# Patient Record
Sex: MALE | Race: WHITE | NOT HISPANIC OR LATINO | Employment: STUDENT | URBAN - METROPOLITAN AREA
[De-identification: names, ages, dates, MRNs, and addresses within clinical notes are randomized per-mention and may not be internally consistent; named-entity substitution may affect disease eponyms.]

---
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
## 2020-09-06 PROCEDURE — U0003 INFECTIOUS AGENT DETECTION BY NUCLEIC ACID (DNA OR RNA); SEVERE ACUTE RESPIRATORY SYNDROME CORONAVIRUS 2 (SARS-COV-2) (CORONAVIRUS DISEASE [COVID-19]), AMPLIFIED PROBE TECHNIQUE, MAKING USE OF HIGH THROUGHPUT TECHNOLOGIES AS DESCRIBED BY CMS-2020-01-R: HCPCS | Performed by: HOSPITALIST

## 2020-09-07 ENCOUNTER — LAB REQUISITION (OUTPATIENT)
Dept: LAB | Facility: HOSPITAL | Age: 20
End: 2020-09-07
Attending: HOSPITALIST
Payer: COMMERCIAL

## 2020-09-07 DIAGNOSIS — Z11.59 ENCOUNTER FOR SCREENING FOR OTHER VIRAL DISEASES: ICD-10-CM

## 2020-09-10 LAB — SARS-COV-2 RNA RESP QL NAA+PROBE: NOT DETECTED

## 2020-09-24 PROCEDURE — U0003 INFECTIOUS AGENT DETECTION BY NUCLEIC ACID (DNA OR RNA); SEVERE ACUTE RESPIRATORY SYNDROME CORONAVIRUS 2 (SARS-COV-2) (CORONAVIRUS DISEASE [COVID-19]), AMPLIFIED PROBE TECHNIQUE, MAKING USE OF HIGH THROUGHPUT TECHNOLOGIES AS DESCRIBED BY CMS-2020-01-R: HCPCS | Performed by: HOSPITALIST

## 2020-09-25 ENCOUNTER — LAB REQUISITION (OUTPATIENT)
Dept: LAB | Facility: HOSPITAL | Age: 20
End: 2020-09-25
Payer: COMMERCIAL

## 2020-09-25 DIAGNOSIS — Z11.59 ENCOUNTER FOR SCREENING FOR OTHER VIRAL DISEASES: ICD-10-CM

## 2020-09-28 LAB — SARS-COV-2 RNA RESP QL NAA+PROBE: NOT DETECTED

## 2020-10-08 ENCOUNTER — LAB REQUISITION (OUTPATIENT)
Dept: LAB | Facility: HOSPITAL | Age: 20
End: 2020-10-08
Attending: HOSPITALIST
Payer: COMMERCIAL

## 2020-10-08 DIAGNOSIS — Z11.59 ENCOUNTER FOR SCREENING FOR OTHER VIRAL DISEASES: ICD-10-CM

## 2020-10-08 PROCEDURE — U0003 INFECTIOUS AGENT DETECTION BY NUCLEIC ACID (DNA OR RNA); SEVERE ACUTE RESPIRATORY SYNDROME CORONAVIRUS 2 (SARS-COV-2) (CORONAVIRUS DISEASE [COVID-19]), AMPLIFIED PROBE TECHNIQUE, MAKING USE OF HIGH THROUGHPUT TECHNOLOGIES AS DESCRIBED BY CMS-2020-01-R: HCPCS | Performed by: HOSPITALIST

## 2020-10-10 LAB — SARS-COV-2 RNA RESP QL NAA+PROBE: NOT DETECTED

## 2020-10-22 ENCOUNTER — LAB REQUISITION (OUTPATIENT)
Dept: LAB | Facility: HOSPITAL | Age: 20
End: 2020-10-22
Attending: HOSPITALIST
Payer: COMMERCIAL

## 2020-10-22 DIAGNOSIS — Z11.59 ENCOUNTER FOR SCREENING FOR OTHER VIRAL DISEASES: ICD-10-CM

## 2020-10-22 PROCEDURE — U0003 INFECTIOUS AGENT DETECTION BY NUCLEIC ACID (DNA OR RNA); SEVERE ACUTE RESPIRATORY SYNDROME CORONAVIRUS 2 (SARS-COV-2) (CORONAVIRUS DISEASE [COVID-19]), AMPLIFIED PROBE TECHNIQUE, MAKING USE OF HIGH THROUGHPUT TECHNOLOGIES AS DESCRIBED BY CMS-2020-01-R: HCPCS | Performed by: HOSPITALIST

## 2020-10-25 LAB — SARS-COV-2 RNA RESP QL NAA+PROBE: NOT DETECTED

## 2020-11-05 ENCOUNTER — LAB REQUISITION (OUTPATIENT)
Dept: LAB | Facility: HOSPITAL | Age: 20
End: 2020-11-05
Attending: HOSPITALIST
Payer: COMMERCIAL

## 2020-11-05 DIAGNOSIS — Z11.59 ENCOUNTER FOR SCREENING FOR OTHER VIRAL DISEASES: ICD-10-CM

## 2020-11-05 PROCEDURE — U0003 INFECTIOUS AGENT DETECTION BY NUCLEIC ACID (DNA OR RNA); SEVERE ACUTE RESPIRATORY SYNDROME CORONAVIRUS 2 (SARS-COV-2) (CORONAVIRUS DISEASE [COVID-19]), AMPLIFIED PROBE TECHNIQUE, MAKING USE OF HIGH THROUGHPUT TECHNOLOGIES AS DESCRIBED BY CMS-2020-01-R: HCPCS | Performed by: HOSPITALIST

## 2020-11-07 LAB — SARS-COV-2 RNA RESP QL NAA+PROBE: NOT DETECTED

## 2020-11-17 PROCEDURE — U0003 INFECTIOUS AGENT DETECTION BY NUCLEIC ACID (DNA OR RNA); SEVERE ACUTE RESPIRATORY SYNDROME CORONAVIRUS 2 (SARS-COV-2) (CORONAVIRUS DISEASE [COVID-19]), AMPLIFIED PROBE TECHNIQUE, MAKING USE OF HIGH THROUGHPUT TECHNOLOGIES AS DESCRIBED BY CMS-2020-01-R: HCPCS | Performed by: HOSPITALIST

## 2020-11-18 ENCOUNTER — LAB REQUISITION (OUTPATIENT)
Dept: LAB | Facility: HOSPITAL | Age: 20
End: 2020-11-18
Attending: HOSPITALIST
Payer: COMMERCIAL

## 2020-11-18 DIAGNOSIS — Z11.59 ENCOUNTER FOR SCREENING FOR OTHER VIRAL DISEASES: ICD-10-CM

## 2020-11-22 LAB — SARS-COV-2 RNA RESP QL NAA+PROBE: NOT DETECTED

## 2021-04-12 ENCOUNTER — APPOINTMENT (OUTPATIENT)
Age: 21
End: 2021-04-12
Payer: COMMERCIAL

## 2021-04-12 PROCEDURE — 0001A: CPT

## 2021-05-03 ENCOUNTER — APPOINTMENT (OUTPATIENT)
Age: 21
End: 2021-05-03
Payer: COMMERCIAL

## 2021-05-03 PROCEDURE — 0002A: CPT

## 2021-07-10 ENCOUNTER — APPOINTMENT (EMERGENCY)
Dept: RADIOLOGY | Facility: HOSPITAL | Age: 21
End: 2021-07-10
Payer: COMMERCIAL

## 2021-07-10 ENCOUNTER — APPOINTMENT (EMERGENCY)
Dept: RADIOLOGY | Facility: HOSPITAL | Age: 21
End: 2021-07-10
Attending: EMERGENCY MEDICINE
Payer: COMMERCIAL

## 2021-07-10 ENCOUNTER — HOSPITAL ENCOUNTER (OUTPATIENT)
Facility: HOSPITAL | Age: 21
Setting detail: OBSERVATION
Discharge: HOME | End: 2021-07-12
Attending: EMERGENCY MEDICINE | Admitting: THORACIC SURGERY (CARDIOTHORACIC VASCULAR SURGERY)
Payer: COMMERCIAL

## 2021-07-10 DIAGNOSIS — J93.9 PNEUMOTHORAX, UNSPECIFIED TYPE: Primary | ICD-10-CM

## 2021-07-10 LAB
ALBUMIN SERPL-MCNC: 5.5 G/DL (ref 3.4–5)
ALP SERPL-CCNC: 56 IU/L (ref 35–126)
ALT SERPL-CCNC: 11 IU/L (ref 16–63)
ANION GAP SERPL CALC-SCNC: 9 MEQ/L (ref 3–15)
AST SERPL-CCNC: 20 IU/L (ref 15–41)
BASOPHILS # BLD: 0.03 K/UL (ref 0.01–0.1)
BASOPHILS NFR BLD: 0.6 %
BILIRUB SERPL-MCNC: 0.4 MG/DL (ref 0.3–1.2)
BUN SERPL-MCNC: 14 MG/DL (ref 8–20)
CALCIUM SERPL-MCNC: 9.8 MG/DL (ref 8.9–10.3)
CHLORIDE SERPL-SCNC: 107 MEQ/L (ref 98–109)
CO2 SERPL-SCNC: 24 MEQ/L (ref 22–32)
CREAT SERPL-MCNC: 1 MG/DL (ref 0.8–1.3)
D DIMER PPP IA.FEU-MCNC: <0.27 UG/ML FEU (ref 0–0.5)
DIFFERENTIAL METHOD BLD: ABNORMAL
EOSINOPHIL # BLD: 0.06 K/UL (ref 0.04–0.54)
EOSINOPHIL NFR BLD: 1.2 %
ERYTHROCYTE [DISTWIDTH] IN BLOOD BY AUTOMATED COUNT: 12.6 % (ref 11.6–14.4)
GFR SERPL CREATININE-BSD FRML MDRD: >60 ML/MIN/1.73M*2
GLUCOSE SERPL-MCNC: 91 MG/DL (ref 70–99)
HCT VFR BLDCO AUTO: 43.2 % (ref 40.1–51)
HGB BLD-MCNC: 14.6 G/DL (ref 13.7–17.5)
IMM GRANULOCYTES # BLD AUTO: 0 K/UL (ref 0–0.08)
IMM GRANULOCYTES NFR BLD AUTO: 0 %
LIPASE SERPL-CCNC: 33 U/L (ref 20–51)
LYMPHOCYTES # BLD: 1.71 K/UL (ref 1.2–3.5)
LYMPHOCYTES NFR BLD: 33.5 %
MCH RBC QN AUTO: 29 PG (ref 28–33.2)
MCHC RBC AUTO-ENTMCNC: 33.8 G/DL (ref 32.2–36.5)
MCV RBC AUTO: 85.9 FL (ref 83–98)
MONOCYTES # BLD: 0.62 K/UL (ref 0.3–1)
MONOCYTES NFR BLD: 12.1 %
NEUTROPHILS # BLD: 2.69 K/UL (ref 1.7–7)
NEUTS SEG NFR BLD: 52.6 %
NRBC BLD-RTO: 0 %
PDW BLD AUTO: 9.2 FL (ref 9.4–12.4)
PLATELET # BLD AUTO: 396 K/UL (ref 150–350)
POTASSIUM SERPL-SCNC: 3.6 MEQ/L (ref 3.6–5.1)
PROT SERPL-MCNC: 8.3 G/DL (ref 6–8.2)
RBC # BLD AUTO: 5.03 M/UL (ref 4.5–5.8)
SARS-COV-2 RNA RESP QL NAA+PROBE: NEGATIVE
SODIUM SERPL-SCNC: 140 MEQ/L (ref 136–144)
TROPONIN I SERPL-MCNC: <0.02 NG/ML
WBC # BLD AUTO: 5.11 K/UL (ref 3.8–10.5)

## 2021-07-10 PROCEDURE — 63700000 HC SELF-ADMINISTRABLE DRUG: Performed by: STUDENT IN AN ORGANIZED HEALTH CARE EDUCATION/TRAINING PROGRAM

## 2021-07-10 PROCEDURE — 71045 X-RAY EXAM CHEST 1 VIEW: CPT

## 2021-07-10 PROCEDURE — 83690 ASSAY OF LIPASE: CPT | Performed by: PHYSICIAN ASSISTANT

## 2021-07-10 PROCEDURE — 80053 COMPREHEN METABOLIC PANEL: CPT | Performed by: PHYSICIAN ASSISTANT

## 2021-07-10 PROCEDURE — G0378 HOSPITAL OBSERVATION PER HR: HCPCS

## 2021-07-10 PROCEDURE — 99285 EMERGENCY DEPT VISIT HI MDM: CPT | Mod: 25

## 2021-07-10 PROCEDURE — 99220 PR INITIAL OBSERVATION CARE/DAY 70 MINUTES: CPT | Performed by: THORACIC SURGERY (CARDIOTHORACIC VASCULAR SURGERY)

## 2021-07-10 PROCEDURE — 32551 INSERTION OF CHEST TUBE: CPT | Mod: LT

## 2021-07-10 PROCEDURE — 36415 COLL VENOUS BLD VENIPUNCTURE: CPT | Performed by: PHYSICIAN ASSISTANT

## 2021-07-10 PROCEDURE — 96361 HYDRATE IV INFUSION ADD-ON: CPT | Mod: 59

## 2021-07-10 PROCEDURE — 93005 ELECTROCARDIOGRAM TRACING: CPT | Mod: 59 | Performed by: PHYSICIAN ASSISTANT

## 2021-07-10 PROCEDURE — 93005 ELECTROCARDIOGRAM TRACING: CPT | Performed by: PHYSICIAN ASSISTANT

## 2021-07-10 PROCEDURE — U0002 COVID-19 LAB TEST NON-CDC: HCPCS | Performed by: PHYSICIAN ASSISTANT

## 2021-07-10 PROCEDURE — 85379 FIBRIN DEGRADATION QUANT: CPT | Performed by: PHYSICIAN ASSISTANT

## 2021-07-10 PROCEDURE — 93005 ELECTROCARDIOGRAM TRACING: CPT | Performed by: THORACIC SURGERY (CARDIOTHORACIC VASCULAR SURGERY)

## 2021-07-10 PROCEDURE — 0W9B30Z DRAINAGE OF LEFT PLEURAL CAVITY WITH DRAINAGE DEVICE, PERCUTANEOUS APPROACH: ICD-10-PCS | Performed by: THORACIC SURGERY (CARDIOTHORACIC VASCULAR SURGERY)

## 2021-07-10 PROCEDURE — 85025 COMPLETE CBC W/AUTO DIFF WBC: CPT | Performed by: PHYSICIAN ASSISTANT

## 2021-07-10 PROCEDURE — 3E033NZ INTRODUCTION OF ANALGESICS, HYPNOTICS, SEDATIVES INTO PERIPHERAL VEIN, PERCUTANEOUS APPROACH: ICD-10-PCS | Performed by: EMERGENCY MEDICINE

## 2021-07-10 PROCEDURE — 200200 PR NO CHARGE: Performed by: THORACIC SURGERY (CARDIOTHORACIC VASCULAR SURGERY)

## 2021-07-10 PROCEDURE — 84484 ASSAY OF TROPONIN QUANT: CPT | Performed by: PHYSICIAN ASSISTANT

## 2021-07-10 PROCEDURE — 63600000 HC DRUGS/DETAIL CODE: Performed by: EMERGENCY MEDICINE

## 2021-07-10 PROCEDURE — 96374 THER/PROPH/DIAG INJ IV PUSH: CPT | Mod: 59

## 2021-07-10 PROCEDURE — 25800000 HC PHARMACY IV SOLUTIONS: Performed by: PHYSICIAN ASSISTANT

## 2021-07-10 RX ORDER — DEXTROSE 40 %
15-30 GEL (GRAM) ORAL AS NEEDED
Status: DISCONTINUED | OUTPATIENT
Start: 2021-07-10 | End: 2021-07-12 | Stop reason: HOSPADM

## 2021-07-10 RX ORDER — FENTANYL CITRATE 50 UG/ML
100 INJECTION, SOLUTION INTRAMUSCULAR; INTRAVENOUS ONCE
Status: COMPLETED | OUTPATIENT
Start: 2021-07-10 | End: 2021-07-10

## 2021-07-10 RX ORDER — ENOXAPARIN SODIUM 100 MG/ML
40 INJECTION SUBCUTANEOUS
Status: DISCONTINUED | OUTPATIENT
Start: 2021-07-11 | End: 2021-07-12 | Stop reason: HOSPADM

## 2021-07-10 RX ORDER — LORAZEPAM 2 MG/ML
0.5 INJECTION INTRAMUSCULAR ONCE
Status: COMPLETED | OUTPATIENT
Start: 2021-07-10 | End: 2021-07-10

## 2021-07-10 RX ORDER — DEXTROSE 50 % IN WATER (D50W) INTRAVENOUS SYRINGE
25 AS NEEDED
Status: DISCONTINUED | OUTPATIENT
Start: 2021-07-10 | End: 2021-07-12 | Stop reason: HOSPADM

## 2021-07-10 RX ORDER — LORAZEPAM 0.5 MG/1
0.5 TABLET ORAL DAILY PRN
COMMUNITY

## 2021-07-10 RX ORDER — MORPHINE SULFATE 2 MG/ML
2 INJECTION, SOLUTION INTRAMUSCULAR; INTRAVENOUS
Status: DISCONTINUED | OUTPATIENT
Start: 2021-07-10 | End: 2021-07-12

## 2021-07-10 RX ORDER — FAMOTIDINE 20 MG/1
20 TABLET, FILM COATED ORAL 2 TIMES DAILY
Status: DISCONTINUED | OUTPATIENT
Start: 2021-07-10 | End: 2021-07-12 | Stop reason: HOSPADM

## 2021-07-10 RX ORDER — IBUPROFEN 200 MG
16-32 TABLET ORAL AS NEEDED
Status: DISCONTINUED | OUTPATIENT
Start: 2021-07-10 | End: 2021-07-12 | Stop reason: HOSPADM

## 2021-07-10 RX ORDER — IBUPROFEN 400 MG/1
400 TABLET ORAL EVERY 6 HOURS
Status: DISCONTINUED | OUTPATIENT
Start: 2021-07-10 | End: 2021-07-12 | Stop reason: HOSPADM

## 2021-07-10 RX ORDER — LISDEXAMFETAMINE DIMESYLATE 30 MG/1
1 CAPSULE ORAL
COMMUNITY
Start: 2021-06-08

## 2021-07-10 RX ORDER — ACETAMINOPHEN 325 MG/1
975 TABLET ORAL EVERY 6 HOURS
Status: DISCONTINUED | OUTPATIENT
Start: 2021-07-10 | End: 2021-07-12 | Stop reason: HOSPADM

## 2021-07-10 RX ADMIN — ACETAMINOPHEN 975 MG: 325 TABLET, FILM COATED ORAL at 17:49

## 2021-07-10 RX ADMIN — SODIUM CHLORIDE 1000 ML: 9 INJECTION, SOLUTION INTRAVENOUS at 15:38

## 2021-07-10 RX ADMIN — LORAZEPAM 0.5 MG: 2 INJECTION INTRAMUSCULAR; INTRAVENOUS at 15:39

## 2021-07-10 RX ADMIN — ACETAMINOPHEN 975 MG: 325 TABLET, FILM COATED ORAL at 23:28

## 2021-07-10 RX ADMIN — FENTANYL CITRATE 100 MCG: 50 INJECTION, SOLUTION INTRAMUSCULAR; INTRAVENOUS at 17:05

## 2021-07-10 RX ADMIN — IBUPROFEN 400 MG: 400 TABLET ORAL at 23:28

## 2021-07-10 RX ADMIN — IBUPROFEN 400 MG: 400 TABLET ORAL at 17:49

## 2021-07-10 ASSESSMENT — ENCOUNTER SYMPTOMS
DIARRHEA: 0
DIZZINESS: 0
BACK PAIN: 1
APPETITE CHANGE: 0
HEMATURIA: 0
SHORTNESS OF BREATH: 1
NUMBNESS: 0
LIGHT-HEADEDNESS: 0
COUGH: 0
DIAPHORESIS: 0
VOMITING: 0
BLOOD IN STOOL: 0
WEAKNESS: 0
FEVER: 0
SORE THROAT: 0
EYE PAIN: 0
NAUSEA: 0
ABDOMINAL PAIN: 0

## 2021-07-10 ASSESSMENT — PATIENT HEALTH QUESTIONNAIRE - PHQ9: SUM OF ALL RESPONSES TO PHQ9 QUESTIONS 1 & 2: 0

## 2021-07-10 NOTE — ED PROVIDER NOTES
Emergency Medicine Note  HPI   HISTORY OF PRESENT ILLNESS     20-year-old male with history of anxiety and depression presents to the ED for evaluation of chest pain.  Patient reports yesterday around 1 PM noticing left-sided chest dull and pressure with radiation to his left shoulder that has been constant since onset. 30 minutes prior to arrival he developed sudden sternal chest pressure with radiation to his mid back and shortness of breath.  He reports symptoms worsen with exertion.  Pain severity is 4 out of 10.  He was taking NSAIDs yesterday with some improvement in his discomfort.  He presents to the ED tachycardic, he reports hx of tachycardia when in doctor's offices in the past but when he checks his pulse at home it is normal.  He reports his symptoms more severe and different than previous panic attacks, he is prescribed ativan but did not take any for his sx today or yesterday. He denies hemoptysis, fever, leg swelling or calf pain, recent long car rides or plane trips, recent surgery, history of blood clots, smoking or vapeing history, drug use, abdominal pain, nausea, vomiting, diarrhea, black or bloody bowel movements, dizziness, fainting, extremity numbness or weakness, change in appetite, increase in caffeine intake, history of thyroid problems.  Patient reports seeing a cardiologist in July 2020 due to having Covid earlier in the year in [place] with a negative work-up at that time, he currently goes to [facility] locally.      History provided by:  Patient  Chest Pain  Associated symptoms: back pain and shortness of breath    Associated symptoms: no abdominal pain, no cough, no diaphoresis, no dizziness, no fever, no nausea, no numbness, no vomiting and no weakness          Patient History   PAST HISTORY     Reviewed from Nursing Triage:     Social History     Tobacco Use   • Smoking status: Never Smoker   • Smokeless tobacco: Never Used   Vaping Use   • Vaping Use: Never assessed    Substance Use Topics   • Alcohol use: Yes     Comment: 1-2 times a month, 2-3 drinks   • Drug use: No         Review of Systems   REVIEW OF SYSTEMS     Review of Systems   Constitutional: Negative for appetite change, diaphoresis and fever.   HENT: Negative for sore throat.    Eyes: Negative for pain.   Respiratory: Positive for shortness of breath. Negative for cough.    Cardiovascular: Positive for chest pain. Negative for leg swelling.   Gastrointestinal: Negative for abdominal pain, blood in stool, diarrhea, nausea and vomiting.   Genitourinary: Negative for hematuria.   Musculoskeletal: Positive for back pain.   Skin: Negative for rash.   Neurological: Negative for dizziness, syncope, weakness, light-headedness and numbness.         VITALS     ED Vitals    Date/Time Temp Pulse Resp BP SpO2 [facility]   07/10/21 2033 -- 110 20 117/73 100 % [facility]   07/10/21 1933 -- 98 20 150/65 100 % N   07/10/21 1904 -- 120 20 136/70 100 % [facility]   07/10/21 1904 -- 118 18 136/70 95 % [facility]   07/10/21 1707 -- 140 20 131/63 100 % [facility]   07/10/21 1449 -- -- -- 116/81 -- [facility]   07/10/21 1448 36.8 °C (98.3 °F) 113 20 -- 97 % [facility]        Pulse Ox %: 97 % (07/10/21 1448)  Pulse Ox Interpretation: Normal (07/10/21 1448)  Heart Rate: 113 (07/10/21 1448)  Rhythm Strip Interpretation: Sinus Tachycardia (07/10/21 1448)     Physical Exam   PHYSICAL EXAM     Physical Exam  Vitals and nursing note reviewed.   Constitutional:       General: He is not in acute distress.     Comments: Tall, slim   HENT:      Head: Normocephalic.   Eyes:      Conjunctiva/sclera: Conjunctivae normal.   Cardiovascular:      Rate and Rhythm: Regular rhythm. Tachycardia present.      Pulses:           Radial pulses are 2+ on the right side and 2+ on the left side.        Posterior tibial pulses are 2+ on the right side and 2+ on the left side.      Heart sounds: No murmur heard.     Pulmonary:      Effort: Pulmonary effort is normal. No respiratory distress.      Breath sounds:  Normal breath sounds.   Chest:      Chest wall: No tenderness.   Abdominal:      Palpations: Abdomen is soft.      Tenderness: There is no abdominal tenderness. There is no guarding or rebound.   Musculoskeletal:      Cervical back: Neck supple.      Right lower leg: No edema.      Left lower leg: No edema.      Comments: No calf TTP BL   Skin:     General: Skin is warm and dry.      Capillary Refill: Capillary refill takes less than 2 seconds.   Neurological:      Mental Status: He is alert.      Comments: He appears tremulous   Psychiatric:         Mood and Affect: Mood is anxious.           PROCEDURES     Procedures     DATA     Results     Procedure Component Value Units Date/Time    SARS-CoV-2 (COVID-19), PCR Nasopharynx [[id number]]  (Normal) Collected: 07/10/21 1751    Specimen: Nasopharyngeal Swab from Nasopharynx Updated: 07/10/21 1903    Narrative:      The following orders were created for panel order SARS-CoV-2 (COVID-19), PCR Nasopharynx.  Procedure                               Abnormality         Status                     ---------                               -----------         ------                     SARS-CoV-2 (COVID-19), PC...[[id number]]  Normal              Final result                 Please view results for these tests on the individual orders.    SARS-CoV-2 (COVID-19), PCR Nasopharynx [[id number]]  (Normal) Collected: 07/10/21 1751    Specimen: Nasopharyngeal Swab from Nasopharynx Updated: 07/10/21 1903     SARS-CoV-2 (COVID-19) Negative     Comment: EUA/IVD       Narrative:      Nursing instructions: Obtain nasopharyngeal swab ONLY.  Send swab in viral transport media.    Troponin I [[id number]]  (Normal) Collected: 07/10/21 1536    Specimen: Blood, Venous Updated: 07/10/21 1607     Troponin I <0.02 ng/mL     Lipase [[id number]]  (Normal) Collected: 07/10/21 1536    Specimen: Blood, Venous Updated: 07/10/21 1605     Lipase 33 U/L     Comprehensive metabolic panel [[id number]]  (Abnormal)  Collected: 07/10/21 1536    Specimen: Blood, Venous Updated: 07/10/21 1605     Sodium 140 mEQ/L      Potassium 3.6 mEQ/L      Comment: Results obtained on plasma. Plasma Potassium values may be up to 0.4 mEQ/L less than serum values. The differences may be greater for patients with high platelet or white cell counts.        Chloride 107 mEQ/L      CO2 24 mEQ/L      BUN 14 mg/dL      Creatinine 1.0 mg/dL      Glucose 91 mg/dL      Calcium 9.8 mg/dL      AST (SGOT) 20 IU/L      ALT (SGPT) 11 IU/L      Alkaline Phosphatase 56 IU/L      Total Protein 8.3 g/dL      Comment: Test performed on plasma which typically contains approximately 0.4 g/dL more protein than serum.        Albumin 5.5 g/dL      Bilirubin, Total 0.4 mg/dL      eGFR >60.0 mL/min/1.73m*2      Anion Gap 9 mEQ/L     D-dimer, quantitative [[id number]]  (Normal) Collected: 07/10/21 1536    Specimen: Blood, Venous Updated: 07/10/21 1601     D-Dimer, Quant <0.27 ug/mL FEU      Comment: The D-Dimer assay can be used as an aid in the diagnosis of DVT or PE. The test can not be used by itself to exclude DVT or PE. When used as a diagnostic aid, the cutoff value is the same as the reference range: <0.5 ug/ml FEU.       CBC and differential [[id number]]  (Abnormal) Collected: 07/10/21 1536    Specimen: Blood, Venous Updated: 07/10/21 1552     WBC 5.11 K/uL      RBC 5.03 M/uL      Hemoglobin 14.6 g/dL      Hematocrit 43.2 %      MCV 85.9 fL      MCH 29.0 pg      MCHC 33.8 g/dL      RDW 12.6 %      Platelets 396 K/uL      MPV 9.2 fL      Differential Type Auto     nRBC 0.0 %      Immature Granulocytes 0.0 %      Neutrophils 52.6 %      Lymphocytes 33.5 %      Monocytes 12.1 %      Eosinophils 1.2 %      Basophils 0.6 %      Immature Granulocytes, Absolute 0.00 K/uL      Neutrophils, Absolute 2.69 K/uL      Lymphocytes, Absolute 1.71 K/uL      Monocytes, Absolute 0.62 K/uL      Eosinophils, Absolute 0.06 K/uL      Basophils, Absolute 0.03 K/uL           Imaging Results           X-RAY CHEST 1 VIEW (Final result)  Result time 07/11/21 07:23:48    Final result                 Impression:    IMPRESSION: Interval resolution of pneumothorax status post chest tube  placement.             Narrative:    CLINICAL HISTORY: Status post chest tube placement    COMMENT: A single AP view of the chest was obtained and compared to prior study  from earlier the same day.    There is been interval placement of left-sided catheter.  No definite residual  pneumothorax is seen.  There is no pleural effusion.  The heart is normal in  size.                    ED Interpretation    Improved post chest tube placement                              X-RAY CHEST 1 VIEW (Final result)  Result time 07/10/21 16:28:09    Final result                 Impression:    IMPRESSION:    Moderate left-sided pneumothorax.      Finding:    New or increased pneumothorax   Acuity: Significant  Status:  CLOSED    Critical read back was performed and results were read back by Suzie Haider  on 7/10/2021 at 4:27 PM               Narrative:    CLINICAL HISTORY: Chest pain    COMMENT: A single frontal radiograph of the chest is obtained, without  comparison.    The heart is normal in size.  The mediastinum is within normal limits.  The lung fields are clear.  Moderate left-sided pneumothorax.  The regional osseous structures are unremarkable.                                ECG 12 lead   Final Result      ECG 12 lead   Final Result      ECG 12 lead   Final Result          Scoring tools                                 ED Course & MDM   MDM / ED COURSE and CLINICAL IMPRESSIONS     MDM  Number of Diagnoses or Management Options     Amount and/or Complexity of Data Reviewed  Clinical lab tests: ordered and reviewed  Tests in the radiology section of CPT®: ordered and reviewed  Discuss the patient with other providers: yes (Surgery consult)  Independent visualization of images, tracings, or specimens: yes        ED Course as of Jul 12 0131    Sat Jul 10, 2021   1515 EKG interpretation 132 sinus tachycardia, normal axis, no ST elevation, nonspecific ST abnormalities, , QTc 574, + motion artifact    [TC]   1520 Plan: labs including d dimer due to cp, SOB, and tachycardia, troponin to r/o ACS, check for anemia and electrolyte abnormality, CXR, EKG, IVF    [TC]   1539 Repeat EKG interpretation 128 sinus tachycardia, normal axis, nonspecific ST wave abnormalities, no ST elevation, , QTc 589, +motion artifact    [TC]   1608 Troponin I: <0.02 [TC]   1609 D-Dimer, Quant: <0.27 [TC]   1609 No anemia, no significant electrolyte abnormality, normal LFTs and lipase    [TC]   1630 IMPRESSION:     Moderate left-sided pneumothorax.   X-RAY CHEST 1 VIEW [TC]   1630 Surgery paged    [TC]   1631 O2 sat 100% on RA  Pt updated on findings, denies any chest trauma, no previous pneumothorax in the past  HR improved to 98 NSR    [TC]   1635 D/w surgery, will come see pt    [TC]   1719 Chest tube placed by surgery, post placement CXR ordered now    [TC]   1837 Surgery will admit     [TC]      ED Course User Index  [TC] Latrice Haider PA C         Clinical Impressions as of Jul 12 0131   Pneumothorax, unspecified type          Disposition   Admission     Latrice Haider PA C  07/12/21 0131

## 2021-07-10 NOTE — PROGRESS NOTES
General Surgery Daily Progress Note    Subjective     Interval History:   DANIEL   Pigtail was placed yesterday   Pain tolerated   Patient with no CP, SOB, sating 100% on RA  Pt ambulating       Objective     Vital signs in last 24 hours:  Temp:  [36.8 °C (98.3 °F)] 36.8 °C (98.3 °F)  Heart Rate:  [113-140] 140  Resp:  [20] 20  BP: (116-131)/(63-81) 131/63      Intake/Output Summary (Last 24 hours) at 7/10/2021 1806  Last data filed at 7/10/2021 1659  Gross per 24 hour   Intake 1000 ml   Output --   Net 1000 ml     Intake/Output this shift:  I/O this shift:  In: 1000 [IV Piggyback:1000]  Out: -     Physical Exam    General appearance: alert, appears stated age and cooperative  Head: normocephalic, without obvious abnormality, atraumatic  Eyes: conjunctivae/corneas clear. PERRL, EOM's intact. Fundi benign.  Ears: normal TM's and external ear canals both ears  Nose: Nares normal. Septum midline. Mucosa normal. No drainage or sinus tenderness.  Throat: lips, mucosa, and tongue normal; teeth and gums normal  Neck: no adenopathy, no carotid bruit, no JVD, supple, symmetrical, trachea midline and thyroid not enlarged, symmetric, no tenderness/mass/nodules  Back: symmetric, no curvature. ROM normal. No CVA tenderness.  Lungs: clear to auscultation bilaterally  Chest wall: no tenderness, Pigtail in place, no air leak,   Heart: regular rate and rhythm, S1, S2 normal, no murmur, click, rub or gallop  Abdomen: soft, non-tender; bowel sounds normal; no masses, no organomegaly  Extremities: extremities normal, warm and well-perfused; no cyanosis, clubbing, or edema  Pulses: 2+ and symmetric  Skin: Skin color, texture, turgor normal. No rashes or lesions  Lymph nodes: Cervical, supraclavicular, and axillary nodes normal.  Neurologic: Grossly normal          Labs  I have reviewed the patient's labs.  Current labs are within normal limits.    Imaging  I have reviewed the Imaging from the last 24 hrs.      Assessment/Plan      Expected Discharge Date:       F/u CXR   If no air leak will discus plan of removing pigtail     Monica Castro MD

## 2021-07-10 NOTE — PROCEDURES
Procedures    PREOPERATIVE DIAGNOSIS: Left hemothorax,     POSTOPERATIVE DIAGNOSIS: Left hemothorax     PROCEDURE: Insertion of a 12-Vietnamese pigtail catheter in the left pleural space.    PROCEDURE DETAIL: After obtaining informed consent, his posterior left chest was prepped and draped in a usual fashion. Xylocaine 1% was injected and then a 12-Vietnamese pigtail catheter was inserted in the mid axillary line about the fifth intercostal space.  Patient tolerated procedure well without complication   Post CXR was ordered

## 2021-07-10 NOTE — H&P
General Surgery History and Physical    Diagnosis: No admission diagnoses are documented for this encounter..    HPI     20-year-old male with history of anxiety and depression presents to the ED for evaluation of chest pain.  Patient reports yesterday around 1 PM noticing left-sided chest dull and pressure with radiation to his left shoulder that is been constant since onset.  30 minutes prior to arrival he developed sternal chest pressure with radiation to his mid back and shortness of breath.  He reports symptoms worsen with exertion.  Pain severity is 4 out of 10.  He was taking NSAIDs yesterday with some improvement in his discomfort.  He presents the ED tachycardic, he reports hx of tachycardia when in doctor's offices in the past but when he checks his pulse at home it is normal.  He reports his symptoms more severe and different than previous panic attacks, he is prescribed ativan but did not take any for his sx today or yesterday. He denies hemoptysis, fever, leg swelling or calf pain, recent long car rides or plane trips, recent surgery, history of blood clots, smoking history, drug use, abdominal pain, nausea, vomiting, diarrhea, black or bloody bowel movements, dizziness, fainting, extremity numbness or weakness, change in appetite, increase in caffeine intake, history of thyroid problems.  Patient reports seeing a cardiologist in July 2020 due to having Covid earlier in the year in [place] with a negative work-up at that time, he currently goes to [facility] locally.    CXR moderate Left side Pnx          Medical History: History reviewed. No pertinent past medical history.    Surgical History: History reviewed. No pertinent surgical history.    Social History:   Social History     Socioeconomic History   • Marital status: Single     Spouse name: None   • Number of children: None   • Years of education: None   • Highest education level: None   Occupational History   • None   Tobacco Use   •  Smoking status: Never Smoker   • Smokeless tobacco: Never Used   Vaping Use   • Vaping Use: Never assessed   Substance and Sexual Activity   • Alcohol use: Yes     Comment: 1-2 times a month, 2-3 drinks   • Drug use: No   • Sexual activity: None   Other Topics Concern   • None   Social History Narrative   • None     Social Determinants of Health     Financial Resource Strain:    • Difficulty of Paying Living Expenses:    Food Insecurity:    • Worried About Running Out of Food in the Last Year:    • Ran Out of Food in the Last Year:    Transportation Needs:    • Lack of Transportation (Medical):    • Lack of Transportation (Non-Medical):    Physical Activity:    • Days of Exercise per Week:    • Minutes of Exercise per Session:    Stress:    • Feeling of Stress :    Social Connections:    • Frequency of Communication with Friends and Family:    • Frequency of Social Gatherings with Friends and Family:    • Attends [employer] Services:    • Active Member of Clubs or Organizations:    • Attends Club or Organization Meetings:    • Marital Status:    Intimate Partner Violence:    • Fear of Current or Ex-Partner:    • Emotionally Abused:    • Physically Abused:    • Sexually Abused:        Family History: History reviewed. No pertinent family history.    Allergies: Latex, natural rubber    Home Medications:  Not in a hospital admission.    Current Medications:  •  sodium chloride, 1,000 mL, intravenous, Once  •  buPROPion XL    Review of Systems  All other systems reviewed and negative except as noted in the HPI.    Objective     Vital Signs for the last 24 hours:  Temp:  [36.8 °C (98.3 °F)] 36.8 °C (98.3 °F)  Heart Rate:  [113] 113  Resp:  [20] 20  BP: (116)/(81) 116/81    Physicial Exam     Physical Exam  Vitals and nursing note reviewed.   Constitutional:       General: He is not in acute distress.     Comments: Tall, slim   HENT:      Head: Normocephalic.   Eyes:      Conjunctiva/sclera: Conjunctivae normal.    Cardiovascular:      Rate and Rhythm: Regular rhythm. Tachycardia present.      Pulses:           Radial pulses are 2+ on the right side and 2+ on the left side.        Posterior tibial pulses are 2+ on the right side and 2+ on the left side.      Heart sounds: No murmur heard.     Pulmonary:      Effort: Pulmonary effort is normal. No respiratory distress.      Breath sounds: Normal breath sounds.   Abdominal:      Palpations: Abdomen is soft.      Tenderness: There is no abdominal tenderness. There is no guarding or rebound.   Musculoskeletal:      Cervical back: Neck supple.      Right lower leg: No edema.      Left lower leg: No edema.      Comments: No calf TTP BL   Skin:     General: Skin is warm and dry.      Capillary Refill: Capillary refill takes less than 2 seconds.   Neurological:      Mental Status: He is alert.      Comments: He appears tremulous   Psychiatric:         Mood and Affect: Mood is anxious.   Labs  I have reviewed the patient's labs.  Current labs are within normal limits.    Imaging  I have reviewed the Imaging from the last 24 hrs.    Assessment/Plan     Code Status: No Order        Admit to surgery   Pigtail to suction 20  Pain control  Regular diet  IS

## 2021-07-11 ENCOUNTER — APPOINTMENT (OUTPATIENT)
Dept: RADIOLOGY | Facility: HOSPITAL | Age: 21
Setting detail: OBSERVATION
End: 2021-07-11
Attending: STUDENT IN AN ORGANIZED HEALTH CARE EDUCATION/TRAINING PROGRAM
Payer: COMMERCIAL

## 2021-07-11 ENCOUNTER — APPOINTMENT (OUTPATIENT)
Dept: RADIOLOGY | Facility: HOSPITAL | Age: 21
Setting detail: OBSERVATION
End: 2021-07-11
Attending: THORACIC SURGERY (CARDIOTHORACIC VASCULAR SURGERY)
Payer: COMMERCIAL

## 2021-07-11 LAB
ATRIAL RATE: 127
ATRIAL RATE: 128
ATRIAL RATE: 130
P AXIS: 68
P AXIS: 74
PR INTERVAL: 134
PR INTERVAL: 152
QRS DURATION: 100
QRS DURATION: 92
QRS DURATION: 98
QT INTERVAL: 256
QT INTERVAL: 388
QT INTERVAL: 404
QTC CALCULATION(BAZETT): 376
QTC CALCULATION(BAZETT): 574
QTC CALCULATION(BAZETT): 589
R AXIS: 72
R AXIS: 72
R AXIS: 73
T WAVE AXIS: 263
T WAVE AXIS: 70
T WAVE AXIS: 72
VENTRICULAR RATE: 128
VENTRICULAR RATE: 130
VENTRICULAR RATE: 132

## 2021-07-11 PROCEDURE — G0378 HOSPITAL OBSERVATION PER HR: HCPCS

## 2021-07-11 PROCEDURE — 71045 X-RAY EXAM CHEST 1 VIEW: CPT

## 2021-07-11 PROCEDURE — 96372 THER/PROPH/DIAG INJ SC/IM: CPT | Performed by: THORACIC SURGERY (CARDIOTHORACIC VASCULAR SURGERY)

## 2021-07-11 PROCEDURE — 3E013GC INTRODUCTION OF OTHER THERAPEUTIC SUBSTANCE INTO SUBCUTANEOUS TISSUE, PERCUTANEOUS APPROACH: ICD-10-PCS | Performed by: THORACIC SURGERY (CARDIOTHORACIC VASCULAR SURGERY)

## 2021-07-11 PROCEDURE — 63700000 HC SELF-ADMINISTRABLE DRUG: Performed by: STUDENT IN AN ORGANIZED HEALTH CARE EDUCATION/TRAINING PROGRAM

## 2021-07-11 PROCEDURE — 71250 CT THORAX DX C-: CPT | Mod: MG

## 2021-07-11 PROCEDURE — 93010 ELECTROCARDIOGRAM REPORT: CPT | Mod: 77 | Performed by: INTERNAL MEDICINE

## 2021-07-11 PROCEDURE — 93010 ELECTROCARDIOGRAM REPORT: CPT | Performed by: INTERNAL MEDICINE

## 2021-07-11 PROCEDURE — 63600000 HC DRUGS/DETAIL CODE: Performed by: STUDENT IN AN ORGANIZED HEALTH CARE EDUCATION/TRAINING PROGRAM

## 2021-07-11 RX ORDER — BUPROPION HYDROCHLORIDE 300 MG/1
300 TABLET ORAL DAILY
Status: DISCONTINUED | OUTPATIENT
Start: 2021-07-11 | End: 2021-07-12 | Stop reason: HOSPADM

## 2021-07-11 RX ORDER — LISDEXAMFETAMINE DIMESYLATE 30 MG/1
30 CAPSULE ORAL
Status: DISCONTINUED | OUTPATIENT
Start: 2021-07-11 | End: 2021-07-12 | Stop reason: HOSPADM

## 2021-07-11 RX ADMIN — ENOXAPARIN SODIUM 40 MG: 40 INJECTION SUBCUTANEOUS at 05:41

## 2021-07-11 RX ADMIN — FAMOTIDINE 20 MG: 20 TABLET ORAL at 20:37

## 2021-07-11 RX ADMIN — IBUPROFEN 400 MG: 400 TABLET ORAL at 18:21

## 2021-07-11 RX ADMIN — IBUPROFEN 400 MG: 400 TABLET ORAL at 05:41

## 2021-07-11 RX ADMIN — IBUPROFEN 400 MG: 400 TABLET ORAL at 12:23

## 2021-07-11 RX ADMIN — ACETAMINOPHEN 975 MG: 325 TABLET, FILM COATED ORAL at 12:24

## 2021-07-11 RX ADMIN — ACETAMINOPHEN 975 MG: 325 TABLET, FILM COATED ORAL at 05:41

## 2021-07-11 RX ADMIN — FAMOTIDINE 20 MG: 20 TABLET ORAL at 10:05

## 2021-07-11 RX ADMIN — ACETAMINOPHEN 975 MG: 325 TABLET, FILM COATED ORAL at 18:21

## 2021-07-12 ENCOUNTER — APPOINTMENT (OUTPATIENT)
Dept: RADIOLOGY | Facility: HOSPITAL | Age: 21
Setting detail: OBSERVATION
End: 2021-07-12
Attending: STUDENT IN AN ORGANIZED HEALTH CARE EDUCATION/TRAINING PROGRAM
Payer: COMMERCIAL

## 2021-07-12 VITALS
HEART RATE: 61 BPM | RESPIRATION RATE: 16 BRPM | BODY MASS INDEX: 19.14 KG/M2 | OXYGEN SATURATION: 98 % | SYSTOLIC BLOOD PRESSURE: 112 MMHG | TEMPERATURE: 98 F | HEIGHT: 71 IN | WEIGHT: 136.69 LBS | DIASTOLIC BLOOD PRESSURE: 58 MMHG

## 2021-07-12 PROCEDURE — 63600000 HC DRUGS/DETAIL CODE: Performed by: STUDENT IN AN ORGANIZED HEALTH CARE EDUCATION/TRAINING PROGRAM

## 2021-07-12 PROCEDURE — 96372 THER/PROPH/DIAG INJ SC/IM: CPT | Performed by: THORACIC SURGERY (CARDIOTHORACIC VASCULAR SURGERY)

## 2021-07-12 PROCEDURE — 63700000 HC SELF-ADMINISTRABLE DRUG: Performed by: STUDENT IN AN ORGANIZED HEALTH CARE EDUCATION/TRAINING PROGRAM

## 2021-07-12 PROCEDURE — 99220 PR INITIAL OBSERVATION CARE/DAY 70 MINUTES: CPT | Performed by: THORACIC SURGERY (CARDIOTHORACIC VASCULAR SURGERY)

## 2021-07-12 PROCEDURE — 71045 X-RAY EXAM CHEST 1 VIEW: CPT

## 2021-07-12 PROCEDURE — G0378 HOSPITAL OBSERVATION PER HR: HCPCS

## 2021-07-12 RX ADMIN — ENOXAPARIN SODIUM 40 MG: 40 INJECTION SUBCUTANEOUS at 05:18

## 2021-07-12 RX ADMIN — IBUPROFEN 400 MG: 400 TABLET ORAL at 12:08

## 2021-07-12 RX ADMIN — BUPROPION HYDROCHLORIDE 300 MG: 300 TABLET, EXTENDED RELEASE ORAL at 08:51

## 2021-07-12 RX ADMIN — ACETAMINOPHEN 975 MG: 325 TABLET, FILM COATED ORAL at 12:08

## 2021-07-12 RX ADMIN — FAMOTIDINE 20 MG: 20 TABLET ORAL at 08:51

## 2021-07-12 RX ADMIN — IBUPROFEN 400 MG: 400 TABLET ORAL at 05:15

## 2021-07-12 RX ADMIN — ACETAMINOPHEN 975 MG: 325 TABLET, FILM COATED ORAL at 05:15

## 2021-07-12 NOTE — NURSING NOTE
Radiologist called and reported this AM chest x-ray shows small pneumothorax. Radiologist reports that when comparing the x-ray from yesterday morning, the pneumothorax appears minimally bigger than yesterday. Notified the surgery resident through text page, the resident called back and reported that Dr. Womack is aware and the team will proceed with removing the chest tube.

## 2021-07-12 NOTE — PLAN OF CARE
Problem: Adult Inpatient Plan of Care  Goal: Readiness for Transition of Care  Outcome: Progressing  Intervention: Mutually Develop Transition Plan  Flowsheets  Taken 7/12/2021 1133 by Shira Stacy LSW  Anticipated Discharge Disposition: home without services  Discharge Coordination/Progress: SW completed assessment with pt and mother at bedside.  Concerns Comments: Admit w/ L Pneumothorax  Transportation Concerns: car, none  Readmission Within the Last 30 Days: no previous admission in last 30 days  Patient/Family Anticipated Services at Transition: none  Patient/Family Anticipates Transition to: home  Transportation Anticipated: family or friend will provide  Concerns to be Addressed: no discharge needs identified  Patient's Choice of Community Agency(s): No Hx HC/SNF/[facility]  Taken 7/10/2021 2123 by Florence Leggett, RN  Assistive Device/Animal Currently Used at Home: none  SW met with pt and mother at bedside to introduce self and role. Pt confirms he resides in a 2SH w/ roommates, 0ste through back entry. Pt is ind w/ amb and adl's. Pt confirms Dr. Escobedo as PCP and [facility] as pharmacy. No dc needs identified at this time as chest tube was removed. Plan for family to transport at dc. SW to follow.     Dispo:  Home

## 2021-07-12 NOTE — PLAN OF CARE
Problem: Adult Inpatient Plan of Care  Goal: Plan of Care Review  Outcome: Progressing  Flowsheets (Taken 7/12/2021 0676)  Progress: no change  Plan of Care Reviewed With: patient  Outcome Summary: Patient slept well, motrin and tylenol for pain relief, a x 1   Plan of Care Review  Plan of Care Reviewed With: patient  Progress: no change  Outcome Summary: Patient slept well, motrin and tylenol for pain relief, a x 1

## 2021-07-12 NOTE — DISCHARGE INSTRUCTIONS
Thoracic Surgery Discharge Instructions     [facility]     Nutrition:   · Regular diet     Wound Care Instructions:   · Chest tube dressing may be removed when you shower.   · If the chest tube site is still draining, apply a dressing to the site and change it daily or as needed until dry.   · Shower daily (remove dressing before shower and re-apply afterwards if needed)   · Clean incision with soap and water. No lotions, creams, perfumes, etc. directly on incision   · No tub baths until incision completely healed in about 4-6 weeks     Medications:   · Resume all home medications unless otherwise directed by doctor    Reasons to Call the Surgeon:   · Severe and persistent shortness of breath not relieved with rest   · Fever above 101.5 oF   · Redness, swelling or drainage from incision     Follow Up Appointment:   · Call to schedule an appointment with Dr. Womack in 1-2 weeks.   · Dr. Johnathan Womack -[phone]   · Please get an X-Ray 1-2 hours prior to your follow up appointment.

## 2021-07-12 NOTE — DISCHARGE SUMMARY
Inpatient Discharge Summary    BRIEF OVERVIEW  Admitting Provider:  H&P Notes 6/12/2021 to 7/12/2021         Date of Service   Author Author Type Status Note Type File Time    07/10/21 1635  Monica Castro MD Resident Attested H&P 07/11/21 1051          Attending Provider: Johnathan Womack MD PhD Attending phys phone: [phone]  Primary Care Physician at Discharge: Parminder Escobedo [phone]    Admission Date: 7/10/2021     Discharge Date: 7/12/2021    Primary Discharge Diagnosis  Pneumothorax    Secondary Discharge Diagnosis  Active Hospital Problems    Diagnosis Date Noted   • Pneumothorax 07/10/2021      Resolved Hospital Problems   No resolved problems to display.       DETAILS OF HOSPITAL STAY    Operative Procedures Performed      Consults:       Consult Orders During Admission:  None     Procedures: Pigtail chest tube placement   Pertinent Test Results:     Imaging  CT CHEST WITHOUT IV CONTRAST    Result Date: 7/11/2021  IMPRESSION: There is a residual left pneumothorax with a small apical and basilar component as seen on plain film allowing for technique differences.  Overall significantly improved from the preprocedural chest x-ray.. No bulla are appreciated.     X-RAY CHEST 1 VIEW    Result Date: 7/12/2021  IMPRESSION: There may have been minimal enlargement of the very small left apical pneumothorax compared to 7/11/2021 chest x-ray performed at 0557 hours. Finding:    New or increased pneumothorax   Acuity: Critical  Status:  CLOSED Critical read back was performed and results were read back by Akosua Pritchett RN,  on 7/12/2021 10:07 AM COMMENT: Comparison: CT from 7/11/2021 and x-rays most recently from 7/11/2021. Erect portable AP views of the chest reveal a left apically directed pigtail catheter to be in place.  There is a small left apical pneumothorax persisting. This is now located between the posterior third and fourth ribs, previously between the second and third ribs.  It is possible  this represents a very mild interval increase in size of the pneumothorax although it is also possible that the difference is technical.  There is no right pneumothorax.  The lungs appear clear.  Stable cardiomediastinal contours.  No upper abdominal findings of concern.  No bone findings of concern.    X-RAY CHEST 1 VIEW    Result Date: 7/11/2021  IMPRESSION: Tiny left apical pneumothorax cannot be excluded.    X-RAY CHEST 1 VIEW    Result Date: 7/11/2021  IMPRESSION: Interval resolution of pneumothorax status post chest tube placement.    X-RAY CHEST 1 VIEW    Result Date: 7/10/2021  IMPRESSION: Moderate left-sided pneumothorax. Finding:    New or increased pneumothorax   Acuity: Significant  Status:  CLOSED Critical read back was performed and results were read back by Suzie Haider on 7/10/2021 at 4:27 PM           Presenting Problem/History of Present Illness  Pneumothorax [J93.9]  Pneumothorax, unspecified type [J93.9]     20-year-old male with history of anxiety and depression presents to the ED for evaluation of chest pain.  Patient reports for the last 24 h around 1 PM he noticed left-sided chest dull and pressure with radiation to his left shoulder that is been constant since onset.  30 minutes prior to arrival he developed sternal chest pressure with radiation to his mid back and shortness of breath.  He reported symptoms worsen with exertion.  Pain severity is 4 out of 10.  He was taking NSAIDs yesterday with some improvement in his discomfort.  He presented to the ED tachycardic, he reports hx of tachycardia when in doctor's offices in the past but when he checks his pulse at home it is normal.  He reported his symptoms more severe and different than previous panic attacks. He denies hemoptysis, fever, leg swelling or calf pain, recent long car rides or plane trips, recent surgery, history of blood clots, smoking history, drug use, abdominal pain, nausea, vomiting, diarrhea, black or bloody bowel  movements, dizziness, fainting, extremity numbness or weakness, change in appetite, increase in caffeine intake, history of thyroid problems.  Patient reports seeing a cardiologist in July 2020 due to having Covid earlier in the year in [place] with a negative work-up at that time, he currently goes to [facility] locally.  ED CXR was obtained and showed moderate size left Pneumothorax        Exam on Day of Discharge  Patient seen and examined on day of discharge.  Pt felling well, VSS, no CP or SOB,     Hospital Course  Pt underwent Pigtail placement in his left chest in ED, he felt better directly after, CXR was done after and showed resolution of his Pnx  CT was placed to [facility] next day and CT scan was done and revealed no actual lung pathology   On 7/12 CXR obtained and revealed no residual Pnx, Pigtail was removed and Pt was Dcd home     Discharge Orders     Medication List      CONTINUE taking these medications    buPROPion  mg 24 hr tablet  Commonly known as: WELLBUTRIN XL  Take 300 mg by mouth daily.  Dose: 300 mg     LORazepam 0.5 mg tablet  Commonly known as: ATIVAN  Take 0.5 mg by mouth daily as needed for anxiety.  Dose: 0.5 mg     VYVANSE 30 mg capsule  Take 1 capsule by mouth once daily.  Dose: 1 capsule  Generic drug: lisdexamfetamine                  Outpatient Follow-Ups  Encounter Information    This patient does not currently have any appointments scheduled.       Referrals:  No orders of the defined types were placed in this encounter.      Active Issues Requiring Follow-up    What is Needed: F/U with Dr. Womack in 2 weeks       Test Results Pending at Discharge  Unresulted Labs (From admission, onward)    None              Discharge Disposition  Home   Code Status at Discharge: Full Code  Physician Order for Life-Sustaining Treatment Document Status      No documents found

## 2021-07-12 NOTE — PATIENT CARE CONFERENCE
Care Progression Rounds Note  Date: 7/12/2021  Time: 10:32 AM     Patient Name: Gavino Martino     Medical Record Number: [id number]   YOB: 2000  Sex: Male      Room/Bed: 4454    Admitting Diagnosis: Pneumothorax [J93.9]  Pneumothorax, unspecified type [J93.9]   Admit Date/Time: 7/10/2021  2:50 PM    Primary Diagnosis: Pneumothorax  Principal Problem: Pneumothorax    GMLOS: pending  Anticipated Discharge Date: 7/13/2021    AM-PAC:  Mobility Score:      Discharge Planning:       Barriers to Discharge:  Barriers to Discharge: Medical issues not resolved  Comment: pig tail to water seal xray completed, waiting on plan    Participants:  , dietitian/nutrition services, nursing, social work/services, physical therapy

## 2021-07-12 NOTE — ED ATTESTATION NOTE
I have personally seen and examined the patient.  I reviewed and agree with physician assistant / nurse practitioner’s assessment and plan of care, with the following exceptions: None  My examination, assessment, and plan of care of Gavino Martino is as follows:     Exam: Well-appearing, no acute distress, awake alert oriented x3, anxious, regular rate and rhythm, lungs grossly clear, pain with inspiration, no lower extremity edema or calf tenderness, normal pulses    Assessment and plan: Patient with atypical chest pain, spontaneous pneumothorax, surgery consulted, pigtail catheter placed, hospitalized for further evaluation and treatment     Rishabh Calderón,   07/11/21 5032

## 2021-07-12 NOTE — PROGRESS NOTES
General Surgery Daily Progress Note    Subjective     Interval History:     CT Chest completed yesterday showing residual left apical and basilar pneumothoraces. Much improved since CT placement. No bullae appreciated.    NAEON   Pigtail placed 7/10   Pain well controlled   Patient with no CP, SOB, sating 100% on RA  Pt ambulating       Objective     Vital signs in last 24 hours:  Temp:  [36.3 °C (97.3 °F)-36.7 °C (98.1 °F)] 36.3 °C (97.3 °F)  Heart Rate:  [63-76] 63  Resp:  [16-18] 16  BP: (111-125)/(62-67) 111/67      Intake/Output Summary (Last 24 hours) at 7/11/2021 2333  Last data filed at 7/11/2021 1832  Gross per 24 hour   Intake --   Output 1550 ml   Net -1550 ml     Intake/Output this shift:  No intake/output data recorded.    Physical Exam    General appearance: alert, appears stated age and cooperative  Head: normocephalic, without obvious abnormality, atraumatic  Eyes: EOMI. No scleral icterus  Lungs: normal work of breathing  Heart: regular rate and rhythm  Abdomen: belly is soft, non-tender, non-distended. No rigidity, rebound tenderness, or guarding.   Skin: Skin color, texture, turgor normal. No rashes or lesions  MSK: no gross abnormalities  Neurologic: Grossly normal  Psych: behavior and mood normal    Labs  I have reviewed the patient's labs.  Current labs are within normal limits.    Imaging  I have reviewed the Imaging from the last 24 hrs.      Assessment/Plan     21 y/o male PMH anxiety, depression who presented on 7/10 with a moderate left pneumothorax s/p pigtail chest tube placement. CT chest completed 7/11 showing small residual basilar and apical pneumothoraces. No bullous disease.    -F/u CXR   -will discuss removing pigtail today.    Wilfrid Chau MD

## 2021-07-14 ENCOUNTER — TELEPHONE (OUTPATIENT)
Dept: THORACIC SURGERY | Facility: CLINIC | Age: 21
End: 2021-07-14

## 2021-07-14 DIAGNOSIS — J93.0 SPONTANEOUS TENSION PNEUMOTHORAX: Primary | ICD-10-CM

## 2021-07-20 ENCOUNTER — TELEPHONE (OUTPATIENT)
Dept: THORACIC SURGERY | Facility: CLINIC | Age: 21
End: 2021-07-20

## 2021-07-22 NOTE — TELEPHONE ENCOUNTER
Couldn't reach his father but was able to reach Gavino.  He says he is feeling very well.  He would like to go on a small trip next weekend via train which is acceptable.  No other question or concern.  His energy and breathing have been back to baseline for a while now. No fevers chills cough or pain. He will keep his 7/29 appt

## 2021-07-29 ENCOUNTER — HOSPITAL ENCOUNTER (OUTPATIENT)
Dept: RADIOLOGY | Facility: HOSPITAL | Age: 21
Discharge: HOME | End: 2021-07-29
Attending: PHYSICIAN ASSISTANT
Payer: COMMERCIAL

## 2021-07-29 ENCOUNTER — OFFICE VISIT (OUTPATIENT)
Dept: THORACIC SURGERY | Facility: CLINIC | Age: 21
End: 2021-07-29
Payer: COMMERCIAL

## 2021-07-29 VITALS
OXYGEN SATURATION: 99 % | WEIGHT: 133 LBS | HEART RATE: 88 BPM | DIASTOLIC BLOOD PRESSURE: 85 MMHG | BODY MASS INDEX: 19.04 KG/M2 | SYSTOLIC BLOOD PRESSURE: 123 MMHG | RESPIRATION RATE: 20 BRPM | HEIGHT: 70 IN

## 2021-07-29 DIAGNOSIS — J93.0 SPONTANEOUS TENSION PNEUMOTHORAX: ICD-10-CM

## 2021-07-29 DIAGNOSIS — J93.11 PRIMARY SPONTANEOUS PNEUMOTHORAX: Primary | ICD-10-CM

## 2021-07-29 PROCEDURE — 3008F BODY MASS INDEX DOCD: CPT | Performed by: THORACIC SURGERY (CARDIOTHORACIC VASCULAR SURGERY)

## 2021-07-29 PROCEDURE — 99214 OFFICE O/P EST MOD 30 MIN: CPT | Performed by: THORACIC SURGERY (CARDIOTHORACIC VASCULAR SURGERY)

## 2021-07-29 PROCEDURE — 71046 X-RAY EXAM CHEST 2 VIEWS: CPT

## 2021-07-29 RX ORDER — ESCITALOPRAM OXALATE 5 MG/1
TABLET ORAL
COMMUNITY
Start: 2021-07-27

## 2021-07-29 NOTE — PROGRESS NOTES
"Thoracic Surgery    Patient ID: Gavino Martino                              : 2000  MRN: [id number]  Clinic: [facility]                                            Visit Date: 2021  Encounter Provider: Johnathan Womack  Referring Provider:          Patient: Gavino Martino  Chief Complaint: Follow-up left spontaneous pneumothorax 7/10/2021      Subjective     Gavino Martino is a 21 y.o. old male who had his first episode of a left spontaneous pneumothorax 7/10/2021 who is presenting today for follow-up evaluation.  He has returned to [place] where he is in [employer] major.  He does not have dyspnea.  He has been resuming most of his normal activities..     His ROS is notable for good energy level absence of dyspnea and absence of pleuritic pain.  The remainder of his review of systems is negative.         Past Medical History:  has no past medical history on file.  Past Surgical History:  has no past surgical history on file.  Social History:  reports that he has never smoked. He has never used smokeless tobacco. He reports current alcohol use. He reports that he does not use drugs.  Medications:   Current Outpatient Medications   Medication Sig Dispense Refill   • buPROPion XL (WELLBUTRIN XL) 300 mg 24 hr tablet Take 300 mg by mouth daily.    6   • escitalopram (LEXAPRO) 5 mg tablet      • LORazepam (ATIVAN) 0.5 mg tablet Take 0.5 mg by mouth daily as needed for anxiety.     • VYVANSE 30 mg capsule Take 1 capsule by mouth once daily.       No current facility-administered medications for this visit.       Allergies:   Allergies   Allergen Reactions   • Latex, Natural Rubber           Objective   Vitals:   Visit Vitals  /85 (BP Location: Right upper arm, Patient Position: Sitting)   Pulse 88   Resp 20   Ht 1.778 m (5' 10\")   Wt 60.3 kg (133 lb)   SpO2 99%   BMI 19.08 kg/m²      General: He appears his stated age and is in no distress   Pulmonary: Clear bilaterally   CV: " Regular   Incisions: Pigtail catheter site is well-healed   Extremities: No clubbing cyanosis or edema      Imaging Review: I have independently evaluated his radiographs and the reports.  His left lung is well-expanded.      Assessment   This 21-year-old male had his first episode of a left spontaneous pneumothorax on 7/10/2021.  He was managed with a pigtail catheter and has done well since then.  We discussed the risk of recurrence (25%).

## 2021-07-29 NOTE — LETTER
Dear Dr Escobedo    I appreciate the opportunity to evaluate  your patient Gavino Martino in the office today.    Gavino Martino is a 21 y.o. old male who had his first episode of a left spontaneous pneumothorax 7/10/2021 who is presenting today for follow-up evaluation.  He has returned to [place] where he is in "[employer] major.  He does not have dyspnea.  He has been resuming most of his normal activities..     His ROS is notable for good energy level absence of dyspnea and absence of pleuritic pain.  The remainder of his review of systems is negative.        This 21-year-old male had his first episode of a left spontaneous pneumothorax on 7/10/2021.  He was managed with a pigtail catheter and has done well since then.  We discussed the risk of recurrence (25%).      Thank you for the opportunity to participate in his care.    Johnathan Womack MD PhD

## 2021-09-07 ENCOUNTER — APPOINTMENT (EMERGENCY)
Dept: RADIOLOGY | Facility: HOSPITAL | Age: 21
End: 2021-09-07
Payer: COMMERCIAL

## 2021-09-07 ENCOUNTER — HOSPITAL ENCOUNTER (EMERGENCY)
Facility: HOSPITAL | Age: 21
Discharge: HOME | End: 2021-09-07
Attending: EMERGENCY MEDICINE
Payer: COMMERCIAL

## 2021-09-07 VITALS
TEMPERATURE: 97.9 F | DIASTOLIC BLOOD PRESSURE: 78 MMHG | BODY MASS INDEX: 19.37 KG/M2 | HEART RATE: 95 BPM | WEIGHT: 135 LBS | RESPIRATION RATE: 16 BRPM | OXYGEN SATURATION: 99 % | SYSTOLIC BLOOD PRESSURE: 127 MMHG

## 2021-09-07 DIAGNOSIS — R07.9 CHEST PAIN, UNSPECIFIED TYPE: Primary | ICD-10-CM

## 2021-09-07 PROCEDURE — 99283 EMERGENCY DEPT VISIT LOW MDM: CPT | Mod: 25

## 2021-09-07 PROCEDURE — 93005 ELECTROCARDIOGRAM TRACING: CPT | Performed by: EMERGENCY MEDICINE

## 2021-09-07 PROCEDURE — 71046 X-RAY EXAM CHEST 2 VIEWS: CPT

## 2021-09-07 ASSESSMENT — ENCOUNTER SYMPTOMS
NEAR-SYNCOPE: 0
SHORTNESS OF BREATH: 0
FATIGUE: 0
ANOREXIA: 0
DIZZINESS: 0
BACK PAIN: 0
HEARTBURN: 0
HEADACHES: 0
DIAPHORESIS: 0
VOMITING: 0
FEVER: 0
PALPITATIONS: 0
PND: 0
ORTHOPNEA: 0
COUGH: 0
ABDOMINAL PAIN: 0
CLAUDICATION: 0
SYNCOPE: 0
TROUBLE SWALLOWING: 0
CHILLS: 0
LOWER EXTREMITY EDEMA: 0
WEAKNESS: 0
NUMBNESS: 0
NAUSEA: 0

## 2021-09-08 LAB
ATRIAL RATE: 95
P AXIS: 78
PR INTERVAL: 130
QRS DURATION: 100
QT INTERVAL: 348
QTC CALCULATION(BAZETT): 437
R AXIS: 80
T WAVE AXIS: 86
VENTRICULAR RATE: 95

## 2021-09-08 PROCEDURE — 93010 ELECTROCARDIOGRAM REPORT: CPT | Performed by: INTERNAL MEDICINE

## 2021-09-08 NOTE — ED ATTESTATION NOTE
I have personally seen and examined the patient.  I reviewed and agree with physician assistant / nurse practitioner’s assessment and plan of care    My examination, assessment, and plan of care of  is as follows:     Patient has a history spontaneous pneumothorax.  Tonight patient is a few hours ago started with substernal chest pain that last about an hour to an hour and a half and has resolved at this time no shortness of breath no sweating.  He felt very similar to pneumothorax has had in the past he was told to come in and be evaluated.  Again symptoms resolved here vital signs are stable    Exam  Patient awake alert oriented in no acute distress  Lungs are clear bilateral auscultation no crepitus appreciated in anterior wall  Heart regular rate and rhythm  Skin warm and dry    Plan  Chest x-ray shows no pneumothorax EKG is unremarkable will discharge patient to return for any new or worse symptoms particularly shortness of breath.     Jamie Scott MD  09/07/21 2534

## 2021-09-08 NOTE — ED PROVIDER NOTES
Emergency Medicine Note  HPI   HISTORY OF PRESENT ILLNESS       History provided by:  Patient  Chest Pain  Pain location:  L chest  Pain quality: sharp    Pain radiates to:  L shoulder  Pain severity:  Moderate  Onset quality:  Sudden  Timing:  Intermittent  Progression:  Partially resolved  Chronicity:  Recurrent  Context comment:  Sitting at work meeting sudden feeling of sharp pain in left chest to shoulder.  felt similar to when he had pneumothorax 2 months ago.  pain feels better now.  just dull ache  Relieved by:  Nothing  Worsened by:  Nothing  Ineffective treatments:  None tried  Associated symptoms: no abdominal pain, no anorexia, no anxiety, no back pain, no claudication, no cough, no diaphoresis, no dizziness, no dysphagia, no fatigue, no fever, no headache, no heartburn, no lower extremity edema, no nausea, no near-syncope, no numbness, no orthopnea, no palpitations, no PND, no shortness of breath, no syncope, no vomiting and no weakness          Patient History   PAST HISTORY     Reviewed from Nursing Triage:      Past Medical History:   Diagnosis Date   • Pneumothorax        History reviewed. No pertinent surgical history.    Family History   Problem Relation Age of Onset   • Depression Biological Mother    • Depression Maternal Grandfather    • Diabetes Paternal Grandfather        Social History     Tobacco Use   • Smoking status: Never Smoker   • Smokeless tobacco: Never Used   Vaping Use   • Vaping Use: Never used   Substance Use Topics   • Alcohol use: Yes     Comment: 1-2 times a month, 2-3 drinks   • Drug use: No         Review of Systems   REVIEW OF SYSTEMS     Review of Systems   Constitutional: Negative for chills, diaphoresis, fatigue and fever.   HENT: Negative for trouble swallowing.    Respiratory: Negative for cough and shortness of breath.    Cardiovascular: Positive for chest pain. Negative for palpitations, orthopnea, claudication, leg swelling, syncope, PND and near-syncope.    Gastrointestinal: Negative for abdominal pain, anorexia, heartburn, nausea and vomiting.   Musculoskeletal: Negative for back pain.   Neurological: Negative for dizziness, weakness, numbness and headaches.         VITALS     ED Vitals    Date/Time Temp Pulse Resp BP SpO2 [facility]   09/07/21 2356 -- 95 16 127/78 99 % JRV   09/07/21 2252 36.6 °C (97.9 °F) 99 18 142/70 100 % MMM        Pulse Ox %: 100 % (09/07/21 2350)  Pulse Ox Interpretation: Normal (09/07/21 2350)  Heart Rate: 99 (09/07/21 2350)  Rhythm Strip Interpretation: Normal Sinus Rhythm (09/07/21 2350)     Physical Exam   PHYSICAL EXAM     Physical Exam  Vitals and nursing note reviewed.   Constitutional:       Appearance: Normal appearance. He is normal weight.   HENT:      Head: Normocephalic.   Eyes:      Conjunctiva/sclera: Conjunctivae normal.   Cardiovascular:      Rate and Rhythm: Normal rate and regular rhythm.      Heart sounds: Normal heart sounds.   Pulmonary:      Effort: Pulmonary effort is normal.      Breath sounds: Normal breath sounds. No wheezing or rales.   Chest:      Chest wall: No tenderness or crepitus.   Abdominal:      General: Abdomen is flat. Bowel sounds are normal.      Palpations: Abdomen is soft.      Tenderness: There is no abdominal tenderness.   Musculoskeletal:      Cervical back: Neck supple.      Right lower leg: No edema (no calf pain bilaterally).      Left lower leg: No edema.   Skin:     General: Skin is warm and dry.      Capillary Refill: Capillary refill takes less than 2 seconds.   Neurological:      Mental Status: He is alert and oriented to person, place, and time.   Psychiatric:         Mood and Affect: Mood normal.           PROCEDURES     ECG 12 lead    Date/Time: 9/7/2021 11:48 PM  Performed by: Chris Siddiqui PA C  Authorized by: Jamie Scott MD     ECG reviewed by ED Physician in the absence of a cardiologist: yes    Previous ECG:     Previous ECG:  Compared to current    Similarity:  No  change  Rate:     ECG rate:  95    ECG rate assessment: normal    Rhythm:     Rhythm: sinus rhythm    Ectopy:     Ectopy: none    QRS:     QRS axis:  Normal    QRS intervals:  Normal  Conduction:     Conduction: normal    ST segments:     ST segments:  Normal  T waves:     T waves: normal           DATA     Results     None          Imaging Results          X-RAY CHEST 2 VIEWS (Preliminary result)  Result time 09/07/21 23:42:20    ED Interpretation    No acute disease reviewed with dr barrow                              ECG 12 lead    (Results Pending)       Scoring tools                                 ED Course & MDM   MDM / ED COURSE and CLINICAL IMPRESSIONS     MDM    Clinical Impressions as of Sep 08 0350   Chest pain, unspecified type            Chris Siddiqui PA C  09/08/21 035

## 2021-09-09 ENCOUNTER — TRANSCRIBE ORDERS (OUTPATIENT)
Dept: RADIOLOGY | Age: 21
End: 2021-09-09
Payer: COMMERCIAL

## 2021-09-09 ENCOUNTER — HOSPITAL ENCOUNTER (OUTPATIENT)
Dept: RADIOLOGY | Age: 21
Discharge: HOME | End: 2021-09-09
Attending: INTERNAL MEDICINE
Payer: COMMERCIAL

## 2021-09-09 DIAGNOSIS — J93.9 PNEUMOTHORAX, UNSPECIFIED: Primary | ICD-10-CM

## 2021-09-09 DIAGNOSIS — J93.9 PNEUMOTHORAX, UNSPECIFIED: ICD-10-CM

## 2021-09-09 PROCEDURE — 71046 X-RAY EXAM CHEST 2 VIEWS: CPT

## 2021-09-29 ENCOUNTER — APPOINTMENT (EMERGENCY)
Dept: RADIOLOGY | Facility: HOSPITAL | Age: 21
DRG: 201 | End: 2021-09-29
Attending: EMERGENCY MEDICINE
Payer: COMMERCIAL

## 2021-09-29 ENCOUNTER — APPOINTMENT (EMERGENCY)
Dept: RADIOLOGY | Facility: HOSPITAL | Age: 21
DRG: 201 | End: 2021-09-29
Attending: STUDENT IN AN ORGANIZED HEALTH CARE EDUCATION/TRAINING PROGRAM
Payer: COMMERCIAL

## 2021-09-29 ENCOUNTER — HOSPITAL ENCOUNTER (INPATIENT)
Facility: HOSPITAL | Age: 21
LOS: 1 days | Discharge: HOME | DRG: 201 | End: 2021-09-30
Attending: EMERGENCY MEDICINE | Admitting: THORACIC SURGERY (CARDIOTHORACIC VASCULAR SURGERY)
Payer: COMMERCIAL

## 2021-09-29 DIAGNOSIS — J93.9 PNEUMOTHORAX, UNSPECIFIED TYPE: Primary | ICD-10-CM

## 2021-09-29 LAB
ANION GAP SERPL CALC-SCNC: 9 MEQ/L (ref 3–15)
BASOPHILS # BLD: 0.05 K/UL (ref 0.01–0.1)
BASOPHILS NFR BLD: 0.6 %
BUN SERPL-MCNC: 17 MG/DL (ref 8–20)
CALCIUM SERPL-MCNC: 10 MG/DL (ref 8.9–10.3)
CHLORIDE SERPL-SCNC: 105 MEQ/L (ref 98–109)
CO2 SERPL-SCNC: 26 MEQ/L (ref 22–32)
CREAT SERPL-MCNC: 1.1 MG/DL (ref 0.8–1.3)
DIFFERENTIAL METHOD BLD: ABNORMAL
EOSINOPHIL # BLD: 0.1 K/UL (ref 0.04–0.54)
EOSINOPHIL NFR BLD: 1.2 %
ERYTHROCYTE [DISTWIDTH] IN BLOOD BY AUTOMATED COUNT: 12.8 % (ref 11.6–14.4)
GFR SERPL CREATININE-BSD FRML MDRD: >60 ML/MIN/1.73M*2
GLUCOSE SERPL-MCNC: 112 MG/DL (ref 70–99)
HCT VFR BLDCO AUTO: 45.8 % (ref 40.1–51)
HGB BLD-MCNC: 15.2 G/DL (ref 13.7–17.5)
IMM GRANULOCYTES # BLD AUTO: 0.02 K/UL (ref 0–0.08)
IMM GRANULOCYTES NFR BLD AUTO: 0.2 %
LYMPHOCYTES # BLD: 3.67 K/UL (ref 1.2–3.5)
LYMPHOCYTES NFR BLD: 43.2 %
MCH RBC QN AUTO: 28.7 PG (ref 28–33.2)
MCHC RBC AUTO-ENTMCNC: 33.2 G/DL (ref 32.2–36.5)
MCV RBC AUTO: 86.4 FL (ref 83–98)
MONOCYTES # BLD: 0.78 K/UL (ref 0.3–1)
MONOCYTES NFR BLD: 9.2 %
NEUTROPHILS # BLD: 3.88 K/UL (ref 1.7–7)
NEUTS SEG NFR BLD: 45.6 %
NRBC BLD-RTO: 0 %
PDW BLD AUTO: 9 FL (ref 9.4–12.4)
PLATELET # BLD AUTO: 419 K/UL (ref 150–350)
POTASSIUM SERPL-SCNC: 3.7 MEQ/L (ref 3.6–5.1)
RBC # BLD AUTO: 5.3 M/UL (ref 4.5–5.8)
SODIUM SERPL-SCNC: 140 MEQ/L (ref 136–144)
WBC # BLD AUTO: 8.5 K/UL (ref 3.8–10.5)

## 2021-09-29 PROCEDURE — 85025 COMPLETE CBC W/AUTO DIFF WBC: CPT | Performed by: PHYSICIAN ASSISTANT

## 2021-09-29 PROCEDURE — 36415 COLL VENOUS BLD VENIPUNCTURE: CPT | Performed by: PHYSICIAN ASSISTANT

## 2021-09-29 PROCEDURE — 93005 ELECTROCARDIOGRAM TRACING: CPT | Performed by: PHYSICIAN ASSISTANT

## 2021-09-29 PROCEDURE — 80048 BASIC METABOLIC PNL TOTAL CA: CPT | Performed by: PHYSICIAN ASSISTANT

## 2021-09-29 PROCEDURE — 99285 EMERGENCY DEPT VISIT HI MDM: CPT | Mod: 25

## 2021-09-29 PROCEDURE — 71045 X-RAY EXAM CHEST 1 VIEW: CPT

## 2021-09-29 ASSESSMENT — ENCOUNTER SYMPTOMS
ABDOMINAL PAIN: 0
SHORTNESS OF BREATH: 1
LIGHT-HEADEDNESS: 0
DIZZINESS: 0
DIARRHEA: 0
NAUSEA: 0
FEVER: 0
CHEST TIGHTNESS: 1
VOMITING: 0
WOUND: 0
CHILLS: 0

## 2021-09-30 ENCOUNTER — APPOINTMENT (INPATIENT)
Dept: RADIOLOGY | Facility: HOSPITAL | Age: 21
DRG: 201 | End: 2021-09-30
Attending: STUDENT IN AN ORGANIZED HEALTH CARE EDUCATION/TRAINING PROGRAM
Payer: COMMERCIAL

## 2021-09-30 ENCOUNTER — HOSPITAL ENCOUNTER (OUTPATIENT)
Facility: HOSPITAL | Age: 21
Setting detail: SURGERY ADMIT
End: 2021-09-30
Attending: THORACIC SURGERY (CARDIOTHORACIC VASCULAR SURGERY) | Admitting: THORACIC SURGERY (CARDIOTHORACIC VASCULAR SURGERY)
Payer: COMMERCIAL

## 2021-09-30 ENCOUNTER — APPOINTMENT (INPATIENT)
Dept: RADIOLOGY | Facility: HOSPITAL | Age: 21
DRG: 201 | End: 2021-09-30
Attending: THORACIC SURGERY (CARDIOTHORACIC VASCULAR SURGERY)
Payer: COMMERCIAL

## 2021-09-30 VITALS
SYSTOLIC BLOOD PRESSURE: 110 MMHG | WEIGHT: 134 LBS | OXYGEN SATURATION: 100 % | RESPIRATION RATE: 18 BRPM | DIASTOLIC BLOOD PRESSURE: 53 MMHG | TEMPERATURE: 97.5 F | HEIGHT: 71 IN | HEART RATE: 65 BPM | BODY MASS INDEX: 18.76 KG/M2

## 2021-09-30 LAB
ATRIAL RATE: 89
P AXIS: 73
PR INTERVAL: 128
QRS DURATION: 84
QT INTERVAL: 342
QTC CALCULATION(BAZETT): 416
R AXIS: 69
SARS-COV-2 RNA RESP QL NAA+PROBE: NEGATIVE
T WAVE AXIS: 76
VENTRICULAR RATE: 89

## 2021-09-30 PROCEDURE — 71045 X-RAY EXAM CHEST 1 VIEW: CPT

## 2021-09-30 PROCEDURE — 63700000 HC SELF-ADMINISTRABLE DRUG: Performed by: STUDENT IN AN ORGANIZED HEALTH CARE EDUCATION/TRAINING PROGRAM

## 2021-09-30 PROCEDURE — 63600000 HC DRUGS/DETAIL CODE: Performed by: STUDENT IN AN ORGANIZED HEALTH CARE EDUCATION/TRAINING PROGRAM

## 2021-09-30 PROCEDURE — 99223 1ST HOSP IP/OBS HIGH 75: CPT | Performed by: THORACIC SURGERY (CARDIOTHORACIC VASCULAR SURGERY)

## 2021-09-30 PROCEDURE — 71046 X-RAY EXAM CHEST 2 VIEWS: CPT

## 2021-09-30 PROCEDURE — 12000000 HC ROOM AND CARE MED/SURG

## 2021-09-30 PROCEDURE — 96375 TX/PRO/DX INJ NEW DRUG ADDON: CPT

## 2021-09-30 PROCEDURE — 200200 PR NO CHARGE: Performed by: THORACIC SURGERY (CARDIOTHORACIC VASCULAR SURGERY)

## 2021-09-30 PROCEDURE — 63600000 HC DRUGS/DETAIL CODE: Performed by: PHYSICIAN ASSISTANT

## 2021-09-30 PROCEDURE — U0003 INFECTIOUS AGENT DETECTION BY NUCLEIC ACID (DNA OR RNA); SEVERE ACUTE RESPIRATORY SYNDROME CORONAVIRUS 2 (SARS-COV-2) (CORONAVIRUS DISEASE [COVID-19]), AMPLIFIED PROBE TECHNIQUE, MAKING USE OF HIGH THROUGHPUT TECHNOLOGIES AS DESCRIBED BY CMS-2020-01-R: HCPCS | Performed by: PHYSICIAN ASSISTANT

## 2021-09-30 PROCEDURE — 27200122 HC DRAIN CHEST TUBE

## 2021-09-30 PROCEDURE — 0W9B30Z DRAINAGE OF LEFT PLEURAL CAVITY WITH DRAINAGE DEVICE, PERCUTANEOUS APPROACH: ICD-10-PCS | Performed by: THORACIC SURGERY (CARDIOTHORACIC VASCULAR SURGERY)

## 2021-09-30 PROCEDURE — 96374 THER/PROPH/DIAG INJ IV PUSH: CPT

## 2021-09-30 PROCEDURE — 93010 ELECTROCARDIOGRAM REPORT: CPT | Performed by: INTERNAL MEDICINE

## 2021-09-30 RX ORDER — OXYCODONE HYDROCHLORIDE 5 MG/1
5 TABLET ORAL EVERY 4 HOURS PRN
Status: DISCONTINUED | OUTPATIENT
Start: 2021-09-30 | End: 2021-09-30 | Stop reason: HOSPADM

## 2021-09-30 RX ORDER — DEXTROSE 50 % IN WATER (D50W) INTRAVENOUS SYRINGE
25 AS NEEDED
Status: DISCONTINUED | OUTPATIENT
Start: 2021-09-30 | End: 2021-09-30 | Stop reason: HOSPADM

## 2021-09-30 RX ORDER — IBUPROFEN 200 MG
16-32 TABLET ORAL AS NEEDED
Status: DISCONTINUED | OUTPATIENT
Start: 2021-09-30 | End: 2021-09-30 | Stop reason: HOSPADM

## 2021-09-30 RX ORDER — HYDROMORPHONE HYDROCHLORIDE 1 MG/ML
1 INJECTION, SOLUTION INTRAMUSCULAR; INTRAVENOUS; SUBCUTANEOUS ONCE
Status: COMPLETED | OUTPATIENT
Start: 2021-09-30 | End: 2021-09-30

## 2021-09-30 RX ORDER — DEXTROSE 40 %
15-30 GEL (GRAM) ORAL AS NEEDED
Status: DISCONTINUED | OUTPATIENT
Start: 2021-09-30 | End: 2021-09-30 | Stop reason: HOSPADM

## 2021-09-30 RX ORDER — ACETAMINOPHEN 325 MG/1
650 TABLET ORAL EVERY 4 HOURS PRN
Status: DISCONTINUED | OUTPATIENT
Start: 2021-09-30 | End: 2021-09-30 | Stop reason: HOSPADM

## 2021-09-30 RX ORDER — ONDANSETRON HYDROCHLORIDE 2 MG/ML
4 INJECTION, SOLUTION INTRAVENOUS ONCE
Status: COMPLETED | OUTPATIENT
Start: 2021-09-30 | End: 2021-09-30

## 2021-09-30 RX ORDER — BUPROPION HYDROCHLORIDE 300 MG/1
300 TABLET ORAL DAILY
Status: DISCONTINUED | OUTPATIENT
Start: 2021-09-30 | End: 2021-09-30 | Stop reason: HOSPADM

## 2021-09-30 RX ORDER — LORAZEPAM 2 MG/ML
0.5 INJECTION INTRAMUSCULAR ONCE
Status: COMPLETED | OUTPATIENT
Start: 2021-09-30 | End: 2021-09-30

## 2021-09-30 RX ORDER — ENOXAPARIN SODIUM 100 MG/ML
40 INJECTION SUBCUTANEOUS
Status: DISCONTINUED | OUTPATIENT
Start: 2021-10-01 | End: 2021-09-30 | Stop reason: HOSPADM

## 2021-09-30 RX ADMIN — ACETAMINOPHEN 650 MG: 325 TABLET, FILM COATED ORAL at 09:10

## 2021-09-30 RX ADMIN — LORAZEPAM 0.5 MG: 2 INJECTION INTRAMUSCULAR; INTRAVENOUS at 01:45

## 2021-09-30 RX ADMIN — ACETAMINOPHEN 650 MG: 325 TABLET, FILM COATED ORAL at 04:29

## 2021-09-30 RX ADMIN — HYDROMORPHONE HYDROCHLORIDE 1 MG: 1 INJECTION, SOLUTION INTRAMUSCULAR; INTRAVENOUS; SUBCUTANEOUS at 01:44

## 2021-09-30 RX ADMIN — ONDANSETRON 4 MG: 2 INJECTION INTRAMUSCULAR; INTRAVENOUS at 03:20

## 2021-09-30 RX ADMIN — BUPROPION HYDROCHLORIDE 300 MG: 300 TABLET, EXTENDED RELEASE ORAL at 09:07

## 2021-09-30 ASSESSMENT — PATIENT HEALTH QUESTIONNAIRE - PHQ9: SUM OF ALL RESPONSES TO PHQ9 QUESTIONS 1 & 2: 0

## 2021-09-30 NOTE — PLAN OF CARE
Problem: Adult Inpatient Plan of Care  Goal: Readiness for Transition of Care  Intervention: Mutually Develop Transition Plan  Flowsheets (Taken 9/30/2021 1121)  Anticipated Discharge Disposition: home without assistance or services  Equipment Needed After Discharge: none  Assistive Device/Animal Currently Used at Home: none  Anticipated Changes Related to Illness: none  Readmission Within the Last 30 Days: no previous admission in last 30 days  Patient/Family Anticipated Services at Transition: none  Patient/Family Anticipates Transition to: home  Transportation Anticipated: family or friend will provide  Concerns to be Addressed:   no discharge needs identified   denies needs/concerns at this time    Met with pt at bedside. 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 He lives in a [facility] with 4 [place]. He was fully independent PTA. His PCP is in [facility] and he uses [facility] in [place]. He denied h/o DME/HC/SNF. He will use Uber to go home.   Plan is to remove chest tube and DC to home.

## 2021-09-30 NOTE — ED PROVIDER NOTES
Emergency Medicine Note  HPI   HISTORY OF PRESENT ILLNESS       History provided by:  Patient     Patient is a 21-year-old male with past medical history of anxiety previous spontaneous pneumothorax that is presenting the emergency room today due to concern of another pneumothorax.  Patient reports a short while ago he was just sitting around and felt a sudden onset of shortness of breath and a bubbling sensation to the left side of his chest.  He reports this is very similar to how he felt during his first pneumothorax which was on 7/10/2021.  Patient reports he did have a chest tube for this and that is it did not require any surgery.        Patient History   PAST HISTORY     Reviewed from Nursing Triage:      Past Medical History:   Diagnosis Date   • Pneumothorax        History reviewed. No pertinent surgical history.    Family History   Problem Relation Age of Onset   • Depression Biological Mother    • Depression Maternal Grandfather    • Diabetes Paternal Grandfather        Social History     Tobacco Use   • Smoking status: Never Smoker   • Smokeless tobacco: Never Used   Vaping Use   • Vaping Use: Never used   Substance Use Topics   • Alcohol use: Yes     Comment: 1-2 times a month, 2-3 drinks   • Drug use: No         Review of Systems   REVIEW OF SYSTEMS     Review of Systems   Constitutional: Negative for chills and fever.   Respiratory: Positive for chest tightness and shortness of breath.    Cardiovascular: Positive for chest pain.   Gastrointestinal: Negative for abdominal pain, diarrhea, nausea and vomiting.   Skin: Negative for rash and wound.   Neurological: Negative for dizziness, syncope and light-headedness.         VITALS     ED Vitals    Date/Time Temp Pulse Resp BP SpO2 [facility]   09/30/21 0321 -- 100 20 106/54 100 % MJM   09/30/21 0247 -- 86 18 92/60 99 % MJM   09/30/21 0155 -- 66 20 86/41 100 % KL   09/30/21 0148 -- 86 20 121/56 100 % KL   09/29/21 2330 -- 84 20 -- 97 % PMB   09/29/21 2315 -- 82  22 -- 98 % PMB   09/29/21 2300 -- 82 24 -- 98 % PMB   09/29/21 2208 -- 89 16 104/69 100 % PMB   09/29/21 2129 -- 108 13 -- 100 % PMB   09/29/21 2103 37.1 °C (98.7 °F) 134 22 125/67 100 % MJM        Pulse Ox %: 100 % (09/29/21 2244)  Pulse Ox Interpretation: Normal (09/29/21 2244)  Heart Rate: 108 (09/29/21 2244)  Rhythm Strip Interpretation: Sinus Tachycardia (09/29/21 2244)     Physical Exam   PHYSICAL EXAM     Physical Exam  Vitals and nursing note reviewed.   Constitutional:       General: He is in acute distress.   HENT:      Head: Normocephalic and atraumatic.   Cardiovascular:      Rate and Rhythm: Regular rhythm. Tachycardia present.      Pulses: Normal pulses.      Heart sounds: Normal heart sounds.   Pulmonary:      Comments: Patient with decreased breath sounds to left side of the chest concern for pneumothorax normal breath sounds to right lung.  Abdominal:      General: There is no distension.      Palpations: Abdomen is soft.      Tenderness: There is no abdominal tenderness. There is no guarding.   Skin:     General: Skin is warm.   Neurological:      General: No focal deficit present.      Mental Status: He is alert.           PROCEDURES     ECG 12 lead    Date/Time: 9/29/2021 11:04 PM  Performed by: Parminder Hassan PA C  Authorized by: Rishabh Calderón DO     Interpretation:     Interpretation: normal    Rate:     ECG rate:  89    ECG rate assessment: normal    Rhythm:     Rhythm: sinus rhythm    Ectopy:     Ectopy: none    QRS:     QRS axis:  Normal    QRS intervals:  Normal  Conduction:     Conduction: normal    ST segments:     ST segments:  Normal  T waves:     T waves: non-specific           DATA     Results     Procedure Component Value Units Date/Time    SARS-CoV-2 (COVID-19), PCR Nasopharynx [[id number]]  (Normal) Collected: 09/30/21 0247    Specimen: Nasopharyngeal Swab from Nasopharynx Updated: 09/30/21 0347    Narrative:      The following orders were created for panel order SARS-CoV-2  (COVID-19), PCR Nasopharynx.  Procedure                               Abnormality         Status                     ---------                               -----------         ------                     SARS-CoV-2 (COVID-19), P...[[id number]]  Normal              Final result                 Please view results for these tests on the individual orders.    SARS-CoV-2 (COVID-19), PCR Nasopharynx [[id number]]  (Normal) Collected: 09/30/21 0247    Specimen: Nasopharyngeal Swab from Nasopharynx Updated: 09/30/21 0347     SARS-CoV-2 (COVID-19) Negative    Basic metabolic panel [[id number]]  (Abnormal) Collected: 09/29/21 2111    Specimen: Blood, Venous Updated: 09/29/21 2139     Sodium 140 mEQ/L      Potassium 3.7 mEQ/L      Comment: Results obtained on plasma. Plasma Potassium values may be up to 0.4 mEQ/L less than serum values. The differences may be greater for patients with high platelet or white cell counts.        Chloride 105 mEQ/L      CO2 26 mEQ/L      BUN 17 mg/dL      Creatinine 1.1 mg/dL      Glucose 112 mg/dL      Calcium 10.0 mg/dL      eGFR >60.0 mL/min/1.73m*2      Anion Gap 9 mEQ/L     CBC and differential [[id number]]  (Abnormal) Collected: 09/29/21 2111    Specimen: Blood, Venous Updated: 09/29/21 2120     WBC 8.50 K/uL      RBC 5.30 M/uL      Hemoglobin 15.2 g/dL      Hematocrit 45.8 %      MCV 86.4 fL      MCH 28.7 pg      MCHC 33.2 g/dL      RDW 12.8 %      Platelets 419 K/uL      MPV 9.0 fL      Differential Type Auto     nRBC 0.0 %      Immature Granulocytes 0.2 %      Neutrophils 45.6 %      Lymphocytes 43.2 %      Monocytes 9.2 %      Eosinophils 1.2 %      Basophils 0.6 %      Immature Granulocytes, Absolute 0.02 K/uL      Neutrophils, Absolute 3.88 K/uL      Lymphocytes, Absolute 3.67 K/uL      Monocytes, Absolute 0.78 K/uL      Eosinophils, Absolute 0.10 K/uL      Basophils, Absolute 0.05 K/uL           Imaging Results          X-RAY CHEST 1 VIEW (Preliminary result)  Result time 09/29/21  23:06:33    ED Interpretation    Small left upper lobe pneumothorax                              ECG 12 lead    (Results Pending)       Scoring tools                                 ED Course & MDM   MDM / ED COURSE and CLINICAL IMPRESSIONS     MDM    ED Course as of 09/30/21 0550   Wed Sep 29, 2021   2224 I spoke with the surgical resident about the patient she spoke with her attending recommending a repeat chest x-ray at 1 AM if the pneumothorax has not changed in size and patient remains well can be discharged home with follow-up. [TK]   Thu Sep 30, 2021   0229 Surgical resident had reevaluate the patient and place a chest tube they will admit the patient. [TK]      ED Course User Index  [TK] Parminder Hassan PA C         Clinical Impressions as of 09/30/21 0550   Pneumothorax, unspecified type            Parminder Hassan PA C  09/30/21 0550

## 2021-09-30 NOTE — DISCHARGE SUMMARY
Inpatient Discharge Summary    BRIEF OVERVIEW  Admitting Provider:  H&P Notes 8/31/2021 to 9/30/2021             Date of Service   Author Author Type Status Note Type File Time    09/30/21 0210  Kellie Ray MD Resident Attested H&P 09/30/21 0955            Attending Provider: Antonio Singletary MD Attending phys phone: [phone]  Primary Care Physician at Discharge: Parminder Escobedo [phone]    Admission Date: 9/29/2021     Discharge Date: 9/30/2021    Primary Discharge Diagnosis  Pneumothorax    Secondary Discharge Diagnosis  Active Hospital Problems    Diagnosis Date Noted    Pneumothorax 07/10/2021      Resolved Hospital Problems   No resolved problems to display.       DETAILS OF HOSPITAL STAY    Operative Procedures Performed      Consults:   Consult Notes 8/31/2021 to 9/30/2021             Date of Service   Author Author Type Status Note Type File Time    09/29/21 2257  Kellie Ray MD Resident Deleted Consults 09/29/21 2302            Consult Orders During Admission:  None     Procedures: pigtail L chest  Pertinent Test Results:     Imaging  X-RAY CHEST 2 VIEWS    Result Date: 9/30/2021  IMPRESSION:No appreciable pneumothorax. A left chest tube remains unchanged in position.    X-RAY CHEST 2 VIEWS    Result Date: 9/10/2021  IMPRESSION:  No active cardiopulmonary disease. I certify that I have personally reviewed this study and agree with this report. Stefany Christopher MD    X-RAY CHEST 2 VIEWS    Result Date: 9/8/2021  IMPRESSION: No evidence of acute disease. COMMENT: Comparison: Multiple prior studies PA and lateral views the chest are performed.  The lungs appear clear.  No evidence of pneumothorax bilaterally.  Specifically there is no evidence of a left-sided pneumothorax to explain the patient's left-sided pain.  No evidence of pleural effusion.  Normal cardiomediastinal contours.  Redemonstration of relatively large lung volumes.  No findings of concern in the upper abdomen  or in the osseous structures.     X-RAY CHEST 1 VIEW    Result Date: 9/30/2021  IMPRESSION:Interval decrease in size of left pneumothorax with tiny residual left apical pneumothorax status post left chest tube insertion.    X-RAY CHEST 1 VIEW    Result Date: 9/30/2021  IMPRESSION: A moderate-sized left pneumothorax is stable to perhaps slightly increased in size since prior study performed September 29, 2021. No evidence of a tension component.    X-RAY CHEST 1 VIEW    Result Date: 9/30/2021  IMPRESSION: Small left apical pneumothorax.      Presenting Problem/History of Present Illness  Pneumothorax [J93.9]  Pneumothorax, unspecified type [J93.9]     21 year old M with a hx of spontaneous pneumothorax on the L back in July who presents with sternal chest pain.     Patient noted sudden onset of sternal chest pain with a bubbling sensation in the left side of his chest. This felt very similar to his previous pneumothorax. Of note, he was admitted that time and had a pigtail catheter placement and followed up with Dr Womack in the office. On arrival he was placed on 100% non re breather due to known hx of penumothorax, but was 100% on RA at that time. CXR demonstrates a small-moderate L pneumo . Serial CXR demonstrated increase in size of L pneumo.      PMH: pneumothorax  PSH: none  Meds: vyvanse, wellbutrin  All: latex, natural ruber    Exam on Day of Discharge  Patient seen and examined on day of discharge.     Hospital Course  Resolution of PTX with pigtail. Removed. Clear for DC.      Discharge Orders     Medication List        CONTINUE taking these medications      buPROPion  mg 24 hr tablet  Commonly known as: WELLBUTRIN XL  Take 300 mg by mouth daily.  Dose: 300 mg     escitalopram 5 mg tablet  Commonly known as: LEXAPRO     LORazepam 0.5 mg tablet  Commonly known as: ATIVAN  Take 0.5 mg by mouth daily as needed for anxiety.  Dose: 0.5 mg     VYVANSE 30 mg capsule  Take 1 capsule by mouth once daily.  Dose:  1 capsule  Generic drug: lisdexamfetamine                   Outpatient Follow-Up He is scheduled for robotic left Bullectomy/pleurectomy/pleurodesis 10/12/2021  Referrals:        Test Results Pending at Discharge  Unresulted Labs (From admission, onward)                None              Discharge Disposition  Disposition:  improved  Destination:  home      Code Status at Discharge: Full Code  Physician Order for Life-Sustaining Treatment Document Status        No documents found

## 2021-09-30 NOTE — PLAN OF CARE
Problem: Adult Inpatient Plan of Care  Goal: Plan of Care Review  Flowsheets (Taken 9/30/2021 1133)  Progress: no change  Plan of Care Reviewed With: patient  Outcome Summary: Pt triggered for MST- score 2. Spoke to pt, family in room. He was awake and alert after having chest tube placement this am. Pt appeared underweight, and endorsed a wt change over 2 years. Pt reported UBW to be 140 lbs in 2019 and was physically active but has since dropped weight and muscle tone since becoming less active. Pt notes he has a stated wt of 130 lbs for 1 year, and notes that his busy schedule as a student and medications (Vyvanse) interfere with his appetite and ability to consume food consistently, he has recently spoke with an RD at his college regarding weight gain. He is receptive to oral supplements to increase his intake. CBW is 134 lbs. Pt currently on carb consistent diet, noted elevated BG (112), though pt has no history of DM or high BG. Recommending ONS and liberalize diet to regular to encourage good intake.     Goals:  1. Pt to meet >75% of needs via PO  2. No further wt loss  3. Promote wt gain    Recommendations:  Liberalize diet to Regular  Boost plus with lunch  Monitor wt   Follow up with PO intake, wts

## 2021-09-30 NOTE — PATIENT CARE CONFERENCE
Care Progression Rounds Note  Date: 9/30/2021  Time: 11:07 AM     Patient Name: Gavino Martino     Medical Record Number: [id number]   YOB: 2000  Sex: Male      Room/Bed: 0309    Admitting Diagnosis: Pneumothorax [J93.9]  Pneumothorax, unspecified type [J93.9]   Admit Date/Time: 9/29/2021  9:04 PM    Primary Diagnosis: Pneumothorax  Principal Problem: Pneumothorax    GMLOS: pending  Anticipated Discharge Date: 9/30/2021    AM-PAC:  Mobility Score:      Discharge Planning:       Barriers to Discharge:  Barriers to Discharge: None  Comment: cxr showed no pneumo. cx tube out today. d/c today.     Participants:  advanced practice provider, occupational therapy, , social work/services, dietitian/nutrition services, physical therapy, nursing

## 2021-09-30 NOTE — PROCEDURES
Left Chest Tube insertion.     The patient was prepped and draped in the usual sterile fashion. 10 cc of lidocaine were injected into the skin, subcutaneous tissue, and pleura. Using the seldinger technique a 14 Fr pigtail catheter was placed. He tolerated the procedure well. Chest tube hooked to suction and post procedure CXR demonstrated resolution of the R sided pneumo.     Kellie Ray MD  PGY4

## 2021-09-30 NOTE — DISCHARGE INSTRUCTIONS
Pneumothorax  A pneumothorax is commonly called a collapsed lung. It is a condition in which air leaks from a lung and builds up between the thin layer of tissue that covers the lungs (visceral pleura) and the interior wall of the chest cavity (parietal pleura). The air gets trapped outside the lung, between the lung and the chest wall (pleural space). The air takes up space and prevents the lung from fully expanding.  This condition sometimes occurs suddenly with no apparent cause. The buildup of air may be small or large. A small pneumothorax may go away on its own. A large pneumothorax will require treatment and hospitalization.  What are the causes?  This condition may be caused by:  · Trauma and injury to the chest wall.  · Surgery and other medical procedures.  · A complication of an underlying lung problem, especially chronic obstructive pulmonary disease (COPD) or emphysema.  Sometimes the cause of this condition is not known.  What increases the risk?  You are more likely to develop this condition if:  · You have an underlying lung problem.  · You smoke.  · You are 20-40 years old, male, tall, and underweight.  · You have a personal or family history of pneumothorax.  · You have an eating disorder (anorexia nervosa).  This condition can also happen quickly, even in people with no history of lung problems.  What are the signs or symptoms?  Sometimes a pneumothorax will have no symptoms. When symptoms are present, they can include:  · Chest pain.  · Shortness of breath.  · Increased rate of breathing.  · Bluish color to your lips or skin (cyanosis).  How is this diagnosed?  This condition may be diagnosed by:  · A medical history and physical exam.  · A chest X-ray, chest CT scan, or ultrasound.  How is this treated?  Treatment depends on how severe your condition is. The goal of treatment is to remove the extra air and allow your lung to expand back to its normal size.  · For a small pneumothorax:  ? No  treatment may be needed.  ? Extra oxygen is sometimes used to make it go away more quickly.  · For a large pneumothorax or a pneumothorax that is causing symptoms, a procedure is done to drain the air from your lungs. To do this, a health care provider may use:  ? A needle with a syringe. This is used to suck air from a pleural space where no additional leakage is taking place.  ? A chest tube. This is used to suck air where there is ongoing leakage into the pleural space. The chest tube may need to remain in place for several days until the air leak has healed.  · In more severe cases, surgery may be needed to repair the damage that is causing the leak.  · If you have multiple pneumothorax episodes or have an air leak that will not heal, a procedure called a pleurodesis may be done. A medicine is placed in the pleural space to irritate the tissues around the lung so that the lung will stick to the chest wall, seal any leaks, and stop any buildup of air in that space.  If you have an underlying lung problem, severe symptoms, or a large pneumothorax you will usually need to stay in the hospital.  Follow these instructions at home:  Lifestyle  · Do not use any products that contain nicotine or tobacco, such as cigarettes and e-cigarettes. These are major risk factors in pneumothorax. If you need help quitting, ask your health care provider.  · Do not lift anything that is heavier than 10 lb (4.5 kg), or the limit that your health care provider tells you, until he or she says that it is safe.  · Avoid activities that take a lot of effort (strenuous) for as long as told by your health care provider.  · Return to your normal activities as told by your health care provider. Ask your health care provider what activities are safe for you.  · Do not fly in an airplane or scuba dive until your health care provider says it is okay.  General instructions  · Take over-the-counter and prescription medicines only as told by your  health care provider.  · If a cough or pain makes it difficult for you to sleep at night, try sleeping in a semi-upright position in a recliner or by using 2 or 3 pillows.  · If you had a chest tube and it was removed, ask your health care provider when you can remove the bandage (dressing). While the dressing is in place, do not allow it to get wet.  · Keep all follow-up visits as told by your health care provider. This is important.  Contact a health care provider if:  · You cough up thick mucus (sputum) that is yellow or green in color.  · You were treated with a chest tube, and you have redness, increasing pain, or discharge at the site where it was placed.  Get help right away if:  · You have increasing chest pain or shortness of breath.  · You have a cough that will not go away.  · You begin coughing up blood.  · You have pain that is getting worse or is not controlled with medicines.  · The site where your chest tube was located opens up.  · You feel air coming out of the site where the chest tube was placed.  · You have a fever or persistent symptoms for more than 2-3 days.  · You have a fever and your symptoms suddenly get worse.  These symptoms may represent a serious problem that is an emergency. Do not wait to see if the symptoms will go away. Get medical help right away. Call your local emergency services (911 in the [place] Do not drive yourself to the hospital.  Summary  · A pneumothorax, commonly called a collapsed lung, is a condition in which air leaks from a lung and gets trapped between the lung and the chest wall (pleural space).  · The buildup of air may be small or large. A small pneumothorax may go away on its own. A large pneumothorax will require treatment and hospitalization.  · Treatment for this condition depends on how severe the pneumothorax is. The goal of treatment is to remove the extra air and allow the lung to expand back to its normal size.  This information is not intended to  replace advice given to you by your health care provider. Make sure you discuss any questions you have with your health care provider.  Document Revised: 11/30/2018 Document Reviewed: 11/26/2018  [employer]vier Patient Education © 2021 [employer]

## 2021-09-30 NOTE — H&P
Thoracic Surgery History and Physical    Pneumothorax    Code Status: Full Code    HPI     21 year old M with a hx of spontaneous pneumothorax on the L back in July who presents with sternal chest pain.     Patient noted sudden onset of sternal chest pain with a bubbling sensation in the left side of his chest. This felt very similar to his previous pneumothorax. Of note, he was admitted that time and had a pigtail catheter placement and followed up with Dr Womack in the office. On arrival he was placed on 100% non re breather due to known hx of penumothorax, but was 100% on RA at that time. CXR demonstrates a small-moderate L pneumo . Serial CXR demonstrated increase in size of L pneumo.     PMH: pneumothorax  PSH: none  Meds: vyvanse, wellbutrin  All: latex, natural ruber    Review of Systems:  Pertinent items are noted in HPI.    Medical History:   Past Medical History:   Diagnosis Date   • Pneumothorax        Surgical History: History reviewed. No pertinent surgical history.    Social History:   Social History     Socioeconomic History   • Marital status: Single     Spouse name: None   • Number of children: None   • Years of education: None   • Highest education level: None   Occupational History   • Occupation: student   Tobacco Use   • Smoking status: Never Smoker   • Smokeless tobacco: Never Used   Vaping Use   • Vaping Use: Never used   Substance and Sexual Activity   • Alcohol use: Yes     Comment: 1-2 times a month, 2-3 drinks   • Drug use: No   • Sexual activity: Defer   Other Topics Concern   • None   Social History Narrative   • None     Social Determinants of Health     Financial Resource Strain:    • Difficulty of Paying Living Expenses: Not on file   Food Insecurity: No Food Insecurity   • Worried About Running Out of Food in the Last Year: Never true   • Ran Out of Food in the Last Year: Never true   Transportation Needs:    • Lack of Transportation (Medical): Not on file   • Lack of Transportation  (Non-Medical): Not on file   Physical Activity:    • Days of Exercise per Week: Not on file   • Minutes of Exercise per Session: Not on file   Stress:    • Feeling of Stress : Not on file   Social Connections:    • Frequency of Communication with Friends and Family: Not on file   • Frequency of Social Gatherings with Friends and Family: Not on file   • Attends Je[employer] Services: Not on file   • Active Member of Clubs or Organizations: Not on file   • Attends Club or Organization Meetings: Not on file   • Marital Status: Not on file   Intimate Partner Violence:    • Fear of Current or Ex-Partner: Not on file   • Emotionally Abused: Not on file   • Physically Abused: Not on file   • Sexually Abused: Not on file   Housing Stability:    • Unable to Pay for Housing in the Last Year: Not on file   • Number of Places Lived in the Last Year: Not on file   • Unstable Housing in the Last Year: Not on file       Family History:   Family History   Problem Relation Age of Onset   • Depression Biological Mother    • Depression Maternal Grandfather    • Diabetes Paternal Grandfather        Allergies:   Allergies   Allergen Reactions   • Latex, Natural Rubber        Home Medications:  Not in a hospital admission.    Current Medications:  •  acetaminophen, 650 mg, oral, q4h PRN  •  buPROPion XL, 300 mg, oral, Daily  •  glucose, 16-32 g of dextrose, oral, PRN **OR** dextrose, 15-30 g of dextrose, oral, PRN **OR** glucagon, 1 mg, intramuscular, PRN **OR** dextrose in water, 25 mL, intravenous, PRN  •  [START ON 10/1/2021] enoxaparin, 40 mg, subcutaneous, Daily (6a)  •  oxyCODONE, 5 mg, oral, q4h PRN  •  buPROPion XL  •  escitalopram  •  LORazepam  •  VYVANSE    Objective     Physical Exam:  AAOx3  Equal chest rise bilaterally.   RRR  Abd Soft, NT, ND        Last documented Vital Signs:  Vitals:    09/30/21 0155   BP: (!) 86/41   Pulse: 66   Resp: 20   Temp:    SpO2: 100%       Labs:  CBC Results       09/29/21 07/10/21     2111 9727     WBC 8.50 5.11    RBC 5.30 5.03    HGB 15.2 14.6    HCT 45.8 43.2    MCV 86.4 85.9    MCH 28.7 29.0    MCHC 33.2 33.8     396      BMP Results       09/29/21 07/10/21     2111 1536     140    K 3.7 3.6    Cl 105 107    CO2 26 24    Glucose 112 91    BUN 17 14    Creatinine 1.1 1.0    Calcium 10.0 9.8    Anion Gap 9 9    EGFR >60.0 >60.0         Comment for K at 2111 on 09/29/21: Results obtained on plasma. Plasma Potassium values may be up to 0.4 mEQ/L less than serum values. The differences may be greater for patients with high platelet or white cell counts.    Comment for K at 1536 on 07/10/21: Results obtained on plasma. Plasma Potassium values may be up to 0.4 mEQ/L less than serum values. The differences may be greater for patients with high platelet or white cell counts.      PT/PTT Results    No lab values to display.     Troponin I Results       07/10/21     1536    Troponin I <0.02      UA Results    No lab values to display.       Reviewed     Imaging:  Reviewed     VTE Assessment: Assessed     Assessment/Plan     21 year old with spontaneous L pneumothorax. Asymptomatic on presentation but 4 hours serial cxr showed slightly larger pneumo. Despit his overall stability, I did not feel comfortable discharging him with an enlarging pneumothorax. Placed a 14 Fr pigtail at bedside. Chest tube to suction overnight. Late AM CXR     Kellie Ray MD  PGY 4

## 2021-09-30 NOTE — PROGRESS NOTES
Gavino Martino is a 21 y.o. male who was admitted yesterday with a recurrent left spontaneous pneumothorax had progression of his airspace overnight and a left pigtail catheter was placed.  The lung is well-expanded.  This morning there is no air leak    Vital signs in last 24 hours:  Vitals    09/30/21 0247 09/30/21 0321 09/30/21 0406 09/30/21 0847   BP: 92/60 (!) 106/54 110/76 (!) 110/53   Pulse: 86 100 83 65   Resp: 18 20 18 18   Temp:   36.5 °C (97.7 °F) 36.4 °C (97.5 °F)   SpO2: 99% 100% 100% 100%       Exam:  Respiratory: Lung fields are clear.  There is no air leak.  There is no crepitus  Edema: No lower extremity edema present..   Chest tube: No air leak and minimal drainage      Current Meds:  •  acetaminophen, 650 mg, oral, q4h PRN  •  buPROPion XL, 300 mg, oral, Daily  •  glucose, 16-32 g of dextrose, oral, PRN **OR** dextrose, 15-30 g of dextrose, oral, PRN **OR** glucagon, 1 mg, intramuscular, PRN **OR** dextrose in water, 25 mL, intravenous, PRN  •  [START ON 10/1/2021] enoxaparin, 40 mg, subcutaneous, Daily (6a)  •  oxyCODONE, 5 mg, oral, q4h PRN    Intake/Output last 24 hours:  Intake/Output     Intake Evening 09/29/21 1500 - 09/29/21 2259 Night 09/29/21 2300 - 09/30/21 0659 Day 09/30/21 0700 - 09/30/21 1459 Output Evening 09/29/21 1500 - 09/29/21 2259 Night 09/29/21 2300 - 09/30/21 0659 Day 09/30/21 0700 - 09/30/21 1459    IV Piggyback -- -- -- Urine -- -- 700                    Chest Tube -- -- --    Total -- -- -- Total -- -- 700    Last 3 shifts --  Intake: 0       Output: 700       Net: -700          Labs:    CBC Results       09/29/21 07/10/21     2111 1536    WBC 8.50 5.11    RBC 5.30 5.03    HGB 15.2 14.6    HCT 45.8 43.2    MCV 86.4 85.9    MCH 28.7 29.0    MCHC 33.2 33.8     396      BMP Results       09/29/21 07/10/21     2111 1536     140    K 3.7 3.6    Cl 105 107    CO2 26 24    Glucose 112 91    BUN 17 14    Creatinine 1.1 1.0    Calcium 10.0 9.8    Anion Gap 9 9    EGFR  >60.0 >60.0         Comment for K at 2111 on 09/29/21: Results obtained on plasma. Plasma Potassium values may be up to 0.4 mEQ/L less than serum values. The differences may be greater for patients with high platelet or white cell counts.    Comment for K at 1536 on 07/10/21: Results obtained on plasma. Plasma Potassium values may be up to 0.4 mEQ/L less than serum values. The differences may be greater for patients with high platelet or white cell counts.      PT/PTT Results    No lab values to display.     Troponin I Results       07/10/21     1536    Troponin I <0.02      UA Results    No lab values to display.         labs reviewed    Imaging:    I have reviewed the Imaging from the last 24 hrs.    VTE Assessment: I have reassessed and the patient's VTE risk and treatment plan is appropriate.    VTE Prophylaxis: Pharmacological Prophylaxis      Assessment  Lung is well-expanded after placement of the pigtail catheter.      Plan  We will obtain repeat imaging today since there is no air leak.  It is likely will be able to remove the catheter today.  Since this is a recurrence on the left side we have recommended robotic assisted bullectomy, pleurectomy and pleurodesis.  He has a fall break from college the week of October 11.  We will schedule him during that week.

## 2021-09-30 NOTE — CONSULTS
General Surgery Consult    Subjective     Gavino Martino is a 21 y.o. male who was admitted for No admission diagnoses are documented for this encounter.       21 year old M with a hx of spontaneous pneumothorax on the L back in July who presents with sternal chest pain.    Patient noted sudden onset of sternal chest pain with a bubbling sensation in the left side of his chest. This felt very similar to his previous pneumothorax. Of note, he was admitted that time and had a pigtail catheter placement and followed up with Dr Womack in the office. On arrival he was placed on 100% non re breather due to known hx of penumothorax, but was 100% on RA at that time. CXR demonstrates a small-moderate L pneumo       Medical History:   Past Medical History:   Diagnosis Date   • Pneumothorax        Surgical History: History reviewed. No pertinent surgical history.    Social History:   Social History     Social History Narrative   • Not on file       Family History:   Family History   Problem Relation Age of Onset   • Depression Biological Mother    • Depression Maternal Grandfather    • Diabetes Paternal Grandfather        Allergies: Latex, natural rubber    Home Medications:  Not in a hospital admission.    Current Medications:  •  buPROPion XL  •  escitalopram  •  LORazepam  •  VYVANSE    Review of Systems  All other systems reviewed and negative except as noted in the HPI.    Objective     Physicial Exam  Visit Vitals  /67   Pulse (!) 108   Temp 37.1 °C (98.7 °F)   Resp 13   SpO2 100%       General appearance: alert, appears stated age and cooperative  Head: normocephalic, without obvious abnormality, atraumatic  Lungs: equal chest rise bilaterally  Chest wall: no crepitus or tenderness   Heart: regular rate and rhythm, S1, S2 normal, no murmur, click, rub or gallop  Abdomen: soft, non-tender; bowel sounds normal; no masses, no organomegaly  Neurologic: Grossly normal      Labs  Reviewed     Imaging  Reviewed      Assessment  / Plan     Small-moderate L pneumothorax. Patient asymptomatic and 100% on RA. Will observe with repeat CXR in 4 hours. If stable, can go home with outpatient follow up with Dr Womack. If worsening, will discuss CT placement and admission. Discussed with Dr Singletary and ED team.     Kellie Ray MD

## 2021-10-01 NOTE — ED ATTESTATION NOTE
I have personally seen and examined the patient.  I reviewed and agree with physician assistant / nurse practitioner’s assessment and plan of care, with the following exceptions: None  My examination, assessment, and plan of care of Gavino Martino is as follows:     Exam: Well-appearing, no acute distress, wake alert oriented x3, thin build, regular rate and rhythm, normal work of breathing, mildly decreased breath sounds on the left    Assessment and plan: Patient with recurrent spontaneous pneumothorax, evaluated by surgery, plan for observation and repeat x-ray     Rishabh Calderón, DO  10/01/21 0031

## 2021-10-27 ENCOUNTER — HOSPITAL ENCOUNTER (OUTPATIENT)
Dept: RADIOLOGY | Age: 21
Discharge: HOME | End: 2021-10-27
Attending: INTERNAL MEDICINE
Payer: COMMERCIAL

## 2021-10-27 ENCOUNTER — TRANSCRIBE ORDERS (OUTPATIENT)
Dept: RADIOLOGY | Age: 21
End: 2021-10-27
Payer: COMMERCIAL

## 2021-10-27 DIAGNOSIS — J93.9 PNEUMOTHORAX, UNSPECIFIED: Primary | ICD-10-CM

## 2021-10-27 DIAGNOSIS — J93.9 PNEUMOTHORAX, UNSPECIFIED: ICD-10-CM

## 2021-10-27 PROCEDURE — 71046 X-RAY EXAM CHEST 2 VIEWS: CPT

## 2021-12-01 ENCOUNTER — HOSPITAL ENCOUNTER (OUTPATIENT)
Dept: RADIOLOGY | Age: 21
Discharge: HOME | End: 2021-12-01
Attending: NURSE PRACTITIONER
Payer: COMMERCIAL

## 2021-12-01 ENCOUNTER — TRANSCRIBE ORDERS (OUTPATIENT)
Dept: RADIOLOGY | Age: 21
End: 2021-12-01
Payer: COMMERCIAL

## 2021-12-01 DIAGNOSIS — Z48.813 ENCOUNTER FOR SURGICAL AFTERCARE FOLLOWING SURGERY ON THE RESPIRATORY SYSTEM: ICD-10-CM

## 2021-12-01 DIAGNOSIS — Z48.813 ENCOUNTER FOR SURGICAL AFTERCARE FOLLOWING SURGERY ON THE RESPIRATORY SYSTEM: Primary | ICD-10-CM

## 2021-12-01 PROCEDURE — 71046 X-RAY EXAM CHEST 2 VIEWS: CPT
